# Patient Record
Sex: MALE | Race: WHITE | Employment: OTHER | URBAN - NONMETROPOLITAN AREA
[De-identification: names, ages, dates, MRNs, and addresses within clinical notes are randomized per-mention and may not be internally consistent; named-entity substitution may affect disease eponyms.]

---

## 2017-06-05 ENCOUNTER — OFFICE VISIT (OUTPATIENT)
Dept: FAMILY MEDICINE CLINIC | Age: 72
End: 2017-06-05

## 2017-06-05 VITALS
DIASTOLIC BLOOD PRESSURE: 62 MMHG | HEIGHT: 71 IN | HEART RATE: 68 BPM | WEIGHT: 203 LBS | BODY MASS INDEX: 28.42 KG/M2 | SYSTOLIC BLOOD PRESSURE: 104 MMHG

## 2017-06-05 DIAGNOSIS — Z23 NEED FOR VACCINATION FOR STREP PNEUMONIAE: ICD-10-CM

## 2017-06-05 DIAGNOSIS — E11.9 TYPE 2 DIABETES MELLITUS WITHOUT COMPLICATION, WITHOUT LONG-TERM CURRENT USE OF INSULIN (HCC): Primary | ICD-10-CM

## 2017-06-05 DIAGNOSIS — F31.9 BIPOLAR 1 DISORDER (HCC): ICD-10-CM

## 2017-06-05 DIAGNOSIS — E78.00 PURE HYPERCHOLESTEROLEMIA: ICD-10-CM

## 2017-06-05 DIAGNOSIS — I10 ESSENTIAL HYPERTENSION: ICD-10-CM

## 2017-06-05 DIAGNOSIS — Z12.11 SCREENING FOR COLON CANCER: ICD-10-CM

## 2017-06-05 LAB
CREATININE URINE POCT: 100
HBA1C MFR BLD: 6.9 %
MICROALBUMIN/CREAT 24H UR: 10 MG/G{CREAT}
MICROALBUMIN/CREAT UR-RTO: <30

## 2017-06-05 PROCEDURE — 4004F PT TOBACCO SCREEN RCVD TLK: CPT | Performed by: FAMILY MEDICINE

## 2017-06-05 PROCEDURE — G0009 ADMIN PNEUMOCOCCAL VACCINE: HCPCS | Performed by: FAMILY MEDICINE

## 2017-06-05 PROCEDURE — 4040F PNEUMOC VAC/ADMIN/RCVD: CPT | Performed by: FAMILY MEDICINE

## 2017-06-05 PROCEDURE — 1123F ACP DISCUSS/DSCN MKR DOCD: CPT | Performed by: FAMILY MEDICINE

## 2017-06-05 PROCEDURE — 90670 PCV13 VACCINE IM: CPT | Performed by: FAMILY MEDICINE

## 2017-06-05 PROCEDURE — 3044F HG A1C LEVEL LT 7.0%: CPT | Performed by: FAMILY MEDICINE

## 2017-06-05 PROCEDURE — G8420 CALC BMI NORM PARAMETERS: HCPCS | Performed by: FAMILY MEDICINE

## 2017-06-05 PROCEDURE — 99214 OFFICE O/P EST MOD 30 MIN: CPT | Performed by: FAMILY MEDICINE

## 2017-06-05 PROCEDURE — 83036 HEMOGLOBIN GLYCOSYLATED A1C: CPT | Performed by: FAMILY MEDICINE

## 2017-06-05 PROCEDURE — G8598 ASA/ANTIPLAT THER USED: HCPCS | Performed by: FAMILY MEDICINE

## 2017-06-05 PROCEDURE — G8427 DOCREV CUR MEDS BY ELIG CLIN: HCPCS | Performed by: FAMILY MEDICINE

## 2017-06-05 PROCEDURE — 82044 UR ALBUMIN SEMIQUANTITATIVE: CPT | Performed by: FAMILY MEDICINE

## 2017-06-05 PROCEDURE — 3017F COLORECTAL CA SCREEN DOC REV: CPT | Performed by: FAMILY MEDICINE

## 2017-06-05 RX ORDER — OMEPRAZOLE 20 MG/1
20 CAPSULE, DELAYED RELEASE ORAL DAILY
Qty: 90 CAPSULE | Refills: 1 | Status: SHIPPED | OUTPATIENT
Start: 2017-06-05 | End: 2017-12-28 | Stop reason: SDUPTHER

## 2017-06-05 RX ORDER — DIVALPROEX SODIUM 250 MG/1
500 TABLET, DELAYED RELEASE ORAL 2 TIMES DAILY
Qty: 360 TABLET | Refills: 1 | Status: SHIPPED | OUTPATIENT
Start: 2017-06-05 | End: 2017-12-28 | Stop reason: SDUPTHER

## 2017-06-05 RX ORDER — LOVASTATIN 20 MG/1
20 TABLET ORAL DAILY
Qty: 90 TABLET | Refills: 1 | Status: SHIPPED | OUTPATIENT
Start: 2017-06-05 | End: 2017-11-27 | Stop reason: SDUPTHER

## 2017-06-05 RX ORDER — TRIAMTERENE AND HYDROCHLOROTHIAZIDE 37.5; 25 MG/1; MG/1
1 TABLET ORAL DAILY
Qty: 90 TABLET | Refills: 1 | Status: SHIPPED | OUTPATIENT
Start: 2017-06-05 | End: 2017-12-28 | Stop reason: SDUPTHER

## 2017-06-05 ASSESSMENT — ENCOUNTER SYMPTOMS
EYE REDNESS: 0
BLURRED VISION: 0
SHORTNESS OF BREATH: 0
CHEST TIGHTNESS: 0
EYE DISCHARGE: 0
COLOR CHANGE: 0
VOMITING: 0
NAUSEA: 0

## 2017-06-05 ASSESSMENT — PATIENT HEALTH QUESTIONNAIRE - PHQ9
SUM OF ALL RESPONSES TO PHQ9 QUESTIONS 1 & 2: 0
SUM OF ALL RESPONSES TO PHQ QUESTIONS 1-9: 0
1. LITTLE INTEREST OR PLEASURE IN DOING THINGS: 0
2. FEELING DOWN, DEPRESSED OR HOPELESS: 0

## 2017-06-09 DIAGNOSIS — Z12.11 SCREENING FOR COLON CANCER: ICD-10-CM

## 2017-06-09 LAB
CONTROL: NEGATIVE
HEMOCCULT STL QL: POSITIVE

## 2017-06-09 PROCEDURE — 82274 ASSAY TEST FOR BLOOD FECAL: CPT | Performed by: FAMILY MEDICINE

## 2017-11-27 DIAGNOSIS — E78.00 PURE HYPERCHOLESTEROLEMIA: ICD-10-CM

## 2017-11-27 RX ORDER — LOVASTATIN 20 MG/1
20 TABLET ORAL DAILY
Qty: 90 TABLET | Refills: 1 | Status: SHIPPED | OUTPATIENT
Start: 2017-11-27 | End: 2017-12-28 | Stop reason: SDUPTHER

## 2017-11-27 NOTE — TELEPHONE ENCOUNTER
Olga Reveles called requesting a refill on the following medications:  Requested Prescriptions     Pending Prescriptions Disp Refills    lovastatin (MEVACOR) 20 MG tablet 90 tablet 1     Sig: Take 1 tablet by mouth daily       Date of last visit: Visit date not found  Date of next visit (if applicable):Visit date not found  Date of last refill: 06/2017  Pharmacy Name: [facility] Roxana Sanders

## 2017-12-28 ENCOUNTER — OFFICE VISIT (OUTPATIENT)
Dept: FAMILY MEDICINE CLINIC | Age: 72
End: 2017-12-28
Payer: MEDICARE

## 2017-12-28 VITALS
HEIGHT: 69 IN | WEIGHT: 201 LBS | HEART RATE: 70 BPM | DIASTOLIC BLOOD PRESSURE: 76 MMHG | SYSTOLIC BLOOD PRESSURE: 110 MMHG | BODY MASS INDEX: 29.77 KG/M2

## 2017-12-28 DIAGNOSIS — E78.00 PURE HYPERCHOLESTEROLEMIA: ICD-10-CM

## 2017-12-28 DIAGNOSIS — B96.89 ACUTE BACTERIAL SINUSITIS: ICD-10-CM

## 2017-12-28 DIAGNOSIS — I10 ESSENTIAL HYPERTENSION: ICD-10-CM

## 2017-12-28 DIAGNOSIS — E11.9 TYPE 2 DIABETES MELLITUS WITHOUT COMPLICATION, WITHOUT LONG-TERM CURRENT USE OF INSULIN (HCC): Primary | ICD-10-CM

## 2017-12-28 DIAGNOSIS — J01.90 ACUTE BACTERIAL SINUSITIS: ICD-10-CM

## 2017-12-28 DIAGNOSIS — K21.9 GASTROESOPHAGEAL REFLUX DISEASE WITHOUT ESOPHAGITIS: ICD-10-CM

## 2017-12-28 PROCEDURE — 3044F HG A1C LEVEL LT 7.0%: CPT | Performed by: FAMILY MEDICINE

## 2017-12-28 PROCEDURE — 1123F ACP DISCUSS/DSCN MKR DOCD: CPT | Performed by: FAMILY MEDICINE

## 2017-12-28 PROCEDURE — G8598 ASA/ANTIPLAT THER USED: HCPCS | Performed by: FAMILY MEDICINE

## 2017-12-28 PROCEDURE — G8427 DOCREV CUR MEDS BY ELIG CLIN: HCPCS | Performed by: FAMILY MEDICINE

## 2017-12-28 PROCEDURE — 3017F COLORECTAL CA SCREEN DOC REV: CPT | Performed by: FAMILY MEDICINE

## 2017-12-28 PROCEDURE — G8484 FLU IMMUNIZE NO ADMIN: HCPCS | Performed by: FAMILY MEDICINE

## 2017-12-28 PROCEDURE — 4040F PNEUMOC VAC/ADMIN/RCVD: CPT | Performed by: FAMILY MEDICINE

## 2017-12-28 PROCEDURE — 4004F PT TOBACCO SCREEN RCVD TLK: CPT | Performed by: FAMILY MEDICINE

## 2017-12-28 PROCEDURE — G8419 CALC BMI OUT NRM PARAM NOF/U: HCPCS | Performed by: FAMILY MEDICINE

## 2017-12-28 PROCEDURE — 99214 OFFICE O/P EST MOD 30 MIN: CPT | Performed by: FAMILY MEDICINE

## 2017-12-28 RX ORDER — AMOXICILLIN AND CLAVULANATE POTASSIUM 875; 125 MG/1; MG/1
1 TABLET, FILM COATED ORAL 2 TIMES DAILY
Qty: 20 TABLET | Refills: 0 | Status: SHIPPED | OUTPATIENT
Start: 2017-12-28 | End: 2018-01-07

## 2017-12-28 RX ORDER — OMEPRAZOLE 20 MG/1
20 CAPSULE, DELAYED RELEASE ORAL DAILY
Qty: 90 CAPSULE | Refills: 1 | Status: SHIPPED | OUTPATIENT
Start: 2017-12-28 | End: 2018-06-25 | Stop reason: SDUPTHER

## 2017-12-28 RX ORDER — LOVASTATIN 20 MG/1
20 TABLET ORAL DAILY
Qty: 90 TABLET | Refills: 1 | Status: SHIPPED | OUTPATIENT
Start: 2017-12-28 | End: 2018-06-04 | Stop reason: SDUPTHER

## 2017-12-28 RX ORDER — DIVALPROEX SODIUM 250 MG/1
500 TABLET, DELAYED RELEASE ORAL 2 TIMES DAILY
Qty: 360 TABLET | Refills: 1 | Status: SHIPPED | OUTPATIENT
Start: 2017-12-28 | End: 2018-06-25 | Stop reason: SDUPTHER

## 2017-12-28 RX ORDER — TRIAMTERENE AND HYDROCHLOROTHIAZIDE 37.5; 25 MG/1; MG/1
1 TABLET ORAL DAILY
Qty: 90 TABLET | Refills: 1 | Status: SHIPPED | OUTPATIENT
Start: 2017-12-28 | End: 2018-06-25 | Stop reason: SDUPTHER

## 2017-12-28 ASSESSMENT — ENCOUNTER SYMPTOMS
EYE DISCHARGE: 0
VOMITING: 0
COUGH: 1
BLURRED VISION: 0
SORE THROAT: 0
SINUS PRESSURE: 1
COLOR CHANGE: 0
SHORTNESS OF BREATH: 0
EYE REDNESS: 0
CHEST TIGHTNESS: 0
NAUSEA: 0

## 2017-12-28 NOTE — PROGRESS NOTES
[address] [place] [address] Drive, [facility]  Phone:  [phone]  Fax:  [phone]       Name: Abbi Conti  :     Chief Complaint   Patient presents with    6 Month Follow-Up    Diabetes    Hypertension    Hyperlipidemia       HPI:     Abbi Conti is a 66 yo male who presents today with his wife for follow-up of T2DM, hypertension, and hyperlipidemia. His last HbA1c was 6.9% in . His last lipid profile in 2016 revealed elevated triglycerides. He states that he has been doing well overall except for nasal congestion, cough, and sinus pressure that he's had for the past 2 weeks and is not improving. Diabetes   He presents for his follow-up diabetic visit. He has type 2 diabetes mellitus. There are no hypoglycemic associated symptoms. Pertinent negatives for hypoglycemia include no dizziness, headaches or pallor. Pertinent negatives for diabetes include no blurred vision, no chest pain, no foot ulcerations, no polydipsia, no polyphagia and no polyuria. There are no hypoglycemic complications. Risk factors for coronary artery disease include diabetes mellitus, dyslipidemia, hypertension and male sex. Current diabetic treatment includes oral agent (monotherapy). He is compliant with treatment all of the time. He is following a generally healthy diet. Hypertension   This is a chronic problem. The current episode started more than 1 year ago. The problem is unchanged. The problem is controlled. Pertinent negatives include no blurred vision, chest pain, headaches, palpitations, peripheral edema or shortness of breath. There are no associated agents to hypertension. Risk factors for coronary artery disease include diabetes mellitus, dyslipidemia and male gender. Past treatments include diuretics. The current treatment provides moderate improvement. There are no compliance problems. Hyperlipidemia   This is a chronic problem.  The current episode glycosylated hemoglobin (Hb A1C)  -     Comprehensive Metabolic Panel; Future  -     Hemoglobin A1C; Future    Essential hypertension  -     triamterene-hydrochlorothiazide (MAXZIDE-25) 37.5-25 MG per tablet; Take 1 tablet by mouth daily  -     Comprehensive Metabolic Panel; Future    Pure hypercholesterolemia  -     lovastatin (MEVACOR) 20 MG tablet; Take 1 tablet by mouth daily  -     Comprehensive Metabolic Panel; Future  -     Lipid Panel; Future    Gastroesophageal reflux disease without esophagitis  -     omeprazole (PRILOSEC) 20 MG delayed release capsule; Take 1 capsule by mouth Daily    Acute bacterial sinusitis  -     amoxicillin-clavulanate (AUGMENTIN) 875-125 MG per tablet; Take 1 tablet by mouth 2 times daily for 10 days    Other orders  -     divalproex (DEPAKOTE) 250 MG DR tablet; Take 2 tablets by mouth 2 times daily    Will check routine labs and continue on current medications for now; he will be notified if dose adjustments are needed. Prescribed Augmentin for sinusitis and advised rest, increased hydration, OTC sinus medications, etc.    Wang Hidalgo received counseling on the following healthy behaviors: nutrition, exercise and medication adherence  Reviewed prior labs and health maintenance  Continue current medications, diet and exercise. Discussed use, benefit, and side effects of prescribed medications. Barriers to medication compliance addressed.   Patient given educational materials - see patient instructions    Requested Prescriptions     Signed Prescriptions Disp Refills    lovastatin (MEVACOR) 20 MG tablet 90 tablet 1     Sig: Take 1 tablet by mouth daily    divalproex (DEPAKOTE) 250 MG DR tablet 360 tablet 1     Sig: Take 2 tablets by mouth 2 times daily    triamterene-hydrochlorothiazide (MAXZIDE-25) 37.5-25 MG per tablet 90 tablet 1     Sig: Take 1 tablet by mouth daily    omeprazole (PRILOSEC) 20 MG delayed release capsule 90 capsule 1     Sig: Take 1 capsule by mouth Daily   

## 2017-12-28 NOTE — PATIENT INSTRUCTIONS
Patient Education        Learning About Diabetes Food Guidelines  Your Care Instructions    Meal planning is important to manage diabetes. It helps keep your blood sugar at a target level (which you set with your doctor). You don't have to eat special foods. You can eat what your family eats, including sweets once in a while. But you do have to pay attention to how often you eat and how much you eat of certain foods. You may want to work with a dietitian or a certified diabetes educator (CDE) to help you plan meals and snacks. A dietitian or CDE can also help you lose weight if that is one of your goals. What should you know about eating carbs? Managing the amount of carbohydrate (carbs) you eat is an important part of healthy meals when you have diabetes. Carbohydrate is found in many foods. · Learn which foods have carbs. And learn the amounts of carbs in different foods. ¨ Bread, cereal, pasta, and rice have about 15 grams of carbs in a serving. A serving is 1 slice of bread (1 ounce), ½ cup of cooked cereal, or 1/3 cup of cooked pasta or rice. ¨ Fruits have 15 grams of carbs in a serving. A serving is 1 small fresh fruit, such as an apple or orange; ½ of a banana; ½ cup of cooked or canned fruit; ½ cup of fruit juice; 1 cup of melon or raspberries; or 2 tablespoons of dried fruit. ¨ Milk and no-sugar-added yogurt have 15 grams of carbs in a serving. A serving is 1 cup of milk or 2/3 cup of no-sugar-added yogurt. ¨ Starchy vegetables have 15 grams of carbs in a serving. A serving is ½ cup of mashed potatoes or sweet potato; 1 cup winter squash; ½ of a small baked potato; ½ cup of cooked beans; or ½ cup cooked corn or green peas. · Learn how much carbs to eat each day and at each meal. A dietitian or CDE can teach you how to keep track of the amount of carbs you eat. This is called carbohydrate counting. · If you are not sure how to count carbohydrate grams, use the Plate Method to plan meals.  It is a good, quick way to make sure that you have a balanced meal. It also helps you spread carbs throughout the day. ¨ Divide your plate by types of foods. Put non-starchy vegetables on half the plate, meat or other protein food on one-quarter of the plate, and a grain or starchy vegetable in the final quarter of the plate. To this you can add a small piece of fruit and 1 cup of milk or yogurt, depending on how many carbs you are supposed to eat at a meal.  · Try to eat about the same amount of carbs at each meal. Do not \"save up\" your daily allowance of carbs to eat at one meal.  · Proteins have very little or no carbs per serving. Examples of proteins are beef, chicken, turkey, fish, eggs, tofu, cheese, cottage cheese, and peanut butter. A serving size of meat is 3 ounces, which is about the size of a deck of cards. Examples of meat substitute serving sizes (equal to 1 ounce of meat) are 1/4 cup of cottage cheese, 1 egg, 1 tablespoon of peanut butter, and ½ cup of tofu. How can you eat out and still eat healthy? · Learn to estimate the serving sizes of foods that have carbohydrate. If you measure food at home, it will be easier to estimate the amount in a serving of restaurant food. · If the meal you order has too much carbohydrate (such as potatoes, corn, or baked beans), ask to have a low-carbohydrate food instead. Ask for a salad or green vegetables. · If you use insulin, check your blood sugar before and after eating out to help you plan how much to eat in the future. · If you eat more carbohydrate at a meal than you had planned, take a walk or do other exercise. This will help lower your blood sugar. What else should you know? · Limit saturated fat, such as the fat from meat and dairy products. This is a healthy choice because people who have diabetes are at higher risk of heart disease. So choose lean cuts of meat and nonfat or low-fat dairy products.  Use olive or canola oil instead of butter or shortening acetaminophen (Tylenol), ibuprofen (Advil, Motrin), or naproxen (Aleve). Read and follow all instructions on the label. · If the doctor prescribed antibiotics, take them as directed. Do not stop taking them just because you feel better. You need to take the full course of antibiotics. · Be careful when taking over-the-counter cold or flu medicines and Tylenol at the same time. Many of these medicines have acetaminophen, which is Tylenol. Read the labels to make sure that you are not taking more than the recommended dose. Too much acetaminophen (Tylenol) can be harmful. · Breathe warm, moist air from a steamy shower, a hot bath, or a sink filled with hot water. Avoid cold, dry air. Using a humidifier in your home may help. Follow the directions for cleaning the machine. · Use saline (saltwater) nasal washes to help keep your nasal passages open and wash out mucus and bacteria. You can buy saline nose drops at a grocery store or drugstore. Or you can make your own at home by adding 1 teaspoon of salt and 1 teaspoon of baking soda to 2 cups of distilled water. If you make your own, fill a bulb syringe with the solution, insert the tip into your nostril, and squeeze gently. Jayashree Dense your nose. · Put a hot, wet towel or a warm gel pack on your face 3 or 4 times a day for 5 to 10 minutes each time. · Try a decongestant nasal spray like oxymetazoline (Afrin). Do not use it for more than 3 days in a row. Using it for more than 3 days can make your congestion worse. When should you call for help? Call your doctor now or seek immediate medical care if:  ? · You have new or worse swelling or redness in your face or around your eyes. ? · You have a new or higher fever. ? Watch closely for changes in your health, and be sure to contact your doctor if:  ? · You have new or worse facial pain. ? · The mucus from your nose becomes thicker (like pus) or has new blood in it. ? · You are not getting better as expected.

## 2018-01-29 ENCOUNTER — TELEPHONE (OUTPATIENT)
Dept: FAMILY MEDICINE CLINIC | Age: 73
End: 2018-01-29

## 2018-01-29 ENCOUNTER — HOSPITAL ENCOUNTER (OUTPATIENT)
Age: 73
Discharge: HOME | End: 2018-01-29
Payer: MEDICARE

## 2018-01-29 DIAGNOSIS — I10 ESSENTIAL HYPERTENSION: ICD-10-CM

## 2018-01-29 DIAGNOSIS — E78.00 PURE HYPERCHOLESTEROLEMIA: ICD-10-CM

## 2018-01-29 DIAGNOSIS — E11.9 TYPE 2 DIABETES MELLITUS WITHOUT COMPLICATION, WITHOUT LONG-TERM CURRENT USE OF INSULIN (HCC): ICD-10-CM

## 2018-01-29 LAB
ALBUMIN SERPL-MCNC: 4.4 G/DL (ref 3.5–5.1)
ALP BLD-CCNC: 75 U/L (ref 38–126)
ALT SERPL-CCNC: 41 U/L (ref 11–66)
ANION GAP SERPL CALCULATED.3IONS-SCNC: 17 MEQ/L (ref 8–16)
AST SERPL-CCNC: 40 U/L (ref 5–40)
AVERAGE GLUCOSE: 159 MG/DL (ref 70–126)
BILIRUB SERPL-MCNC: 0.4 MG/DL (ref 0.3–1.2)
BUN BLDV-MCNC: 21 MG/DL (ref 7–22)
CALCIUM SERPL-MCNC: 9.6 MG/DL (ref 8.5–10.5)
CHLORIDE BLD-SCNC: 99 MEQ/L (ref 98–111)
CHOLESTEROL, TOTAL: 169 MG/DL (ref 100–199)
CO2: 26 MEQ/L (ref 23–33)
CREAT SERPL-MCNC: 1 MG/DL (ref 0.4–1.2)
GFR SERPL CREATININE-BSD FRML MDRD: 73 ML/MIN/1.73M2
GLUCOSE BLD-MCNC: 154 MG/DL (ref 70–108)
HBA1C MFR BLD: 7.3 % (ref 4.4–6.4)
HDLC SERPL-MCNC: 43 MG/DL
LDL CHOLESTEROL CALCULATED: 81 MG/DL
POTASSIUM SERPL-SCNC: 4.5 MEQ/L (ref 3.5–5.2)
SODIUM BLD-SCNC: 142 MEQ/L (ref 135–145)
TOTAL PROTEIN: 7.5 G/DL (ref 6.1–8)
TRIGL SERPL-MCNC: 223 MG/DL (ref 0–199)

## 2018-01-29 PROCEDURE — 80061 LIPID PANEL: CPT

## 2018-01-29 PROCEDURE — 36415 COLL VENOUS BLD VENIPUNCTURE: CPT

## 2018-01-29 PROCEDURE — 80053 COMPREHEN METABOLIC PANEL: CPT

## 2018-01-29 PROCEDURE — 83036 HEMOGLOBIN GLYCOSYLATED A1C: CPT

## 2018-01-29 NOTE — TELEPHONE ENCOUNTER
----- Message from Pramod Zepeda MD sent at 1/29/2018  2:32 PM EST -----  Please let the patient know that his triglycerides were a little elevated which can be improved with a healthy low-fat diet and routine physical activity.

## 2018-06-04 DIAGNOSIS — E78.00 PURE HYPERCHOLESTEROLEMIA: ICD-10-CM

## 2018-06-04 RX ORDER — LOVASTATIN 20 MG/1
20 TABLET ORAL DAILY
Qty: 90 TABLET | Refills: 1 | Status: SHIPPED | OUTPATIENT
Start: 2018-06-04 | End: 2018-06-25 | Stop reason: SDUPTHER

## 2018-06-22 ASSESSMENT — ENCOUNTER SYMPTOMS
VOMITING: 0
COLOR CHANGE: 0
BLURRED VISION: 0
EYE REDNESS: 0
EYE DISCHARGE: 0
CHEST TIGHTNESS: 0
NAUSEA: 0

## 2018-06-25 ENCOUNTER — OFFICE VISIT (OUTPATIENT)
Dept: FAMILY MEDICINE CLINIC | Age: 73
End: 2018-06-25
Payer: MEDICARE

## 2018-06-25 VITALS
WEIGHT: 201.6 LBS | HEIGHT: 72 IN | DIASTOLIC BLOOD PRESSURE: 78 MMHG | BODY MASS INDEX: 27.3 KG/M2 | HEART RATE: 66 BPM | SYSTOLIC BLOOD PRESSURE: 124 MMHG

## 2018-06-25 DIAGNOSIS — I10 ESSENTIAL HYPERTENSION: ICD-10-CM

## 2018-06-25 DIAGNOSIS — Z12.11 SCREENING FOR COLON CANCER: ICD-10-CM

## 2018-06-25 DIAGNOSIS — E78.00 PURE HYPERCHOLESTEROLEMIA: ICD-10-CM

## 2018-06-25 DIAGNOSIS — E11.9 TYPE 2 DIABETES MELLITUS WITHOUT COMPLICATION, WITHOUT LONG-TERM CURRENT USE OF INSULIN (HCC): ICD-10-CM

## 2018-06-25 DIAGNOSIS — K21.9 GASTROESOPHAGEAL REFLUX DISEASE WITHOUT ESOPHAGITIS: ICD-10-CM

## 2018-06-25 DIAGNOSIS — F31.9 BIPOLAR 1 DISORDER (HCC): ICD-10-CM

## 2018-06-25 DIAGNOSIS — E11.9 TYPE 2 DIABETES MELLITUS WITHOUT COMPLICATION, WITHOUT LONG-TERM CURRENT USE OF INSULIN (HCC): Primary | ICD-10-CM

## 2018-06-25 LAB
CREATININE URINE POCT: 300
HBA1C MFR BLD: 8.1 %
MICROALBUMIN/CREAT 24H UR: 30 MG/G{CREAT}
MICROALBUMIN/CREAT UR-RTO: 30

## 2018-06-25 PROCEDURE — 3045F PR MOST RECENT HEMOGLOBIN A1C LEVEL 7.0-9.0%: CPT | Performed by: FAMILY MEDICINE

## 2018-06-25 PROCEDURE — 2022F DILAT RTA XM EVC RTNOPTHY: CPT | Performed by: FAMILY MEDICINE

## 2018-06-25 PROCEDURE — 3017F COLORECTAL CA SCREEN DOC REV: CPT | Performed by: FAMILY MEDICINE

## 2018-06-25 PROCEDURE — 83036 HEMOGLOBIN GLYCOSYLATED A1C: CPT | Performed by: FAMILY MEDICINE

## 2018-06-25 PROCEDURE — 3288F FALL RISK ASSESSMENT DOCD: CPT | Performed by: FAMILY MEDICINE

## 2018-06-25 PROCEDURE — G8427 DOCREV CUR MEDS BY ELIG CLIN: HCPCS | Performed by: FAMILY MEDICINE

## 2018-06-25 PROCEDURE — 82044 UR ALBUMIN SEMIQUANTITATIVE: CPT | Performed by: FAMILY MEDICINE

## 2018-06-25 PROCEDURE — 4040F PNEUMOC VAC/ADMIN/RCVD: CPT | Performed by: FAMILY MEDICINE

## 2018-06-25 PROCEDURE — 4004F PT TOBACCO SCREEN RCVD TLK: CPT | Performed by: FAMILY MEDICINE

## 2018-06-25 PROCEDURE — G8598 ASA/ANTIPLAT THER USED: HCPCS | Performed by: FAMILY MEDICINE

## 2018-06-25 PROCEDURE — G8510 SCR DEP NEG, NO PLAN REQD: HCPCS | Performed by: FAMILY MEDICINE

## 2018-06-25 PROCEDURE — 1123F ACP DISCUSS/DSCN MKR DOCD: CPT | Performed by: FAMILY MEDICINE

## 2018-06-25 PROCEDURE — G8419 CALC BMI OUT NRM PARAM NOF/U: HCPCS | Performed by: FAMILY MEDICINE

## 2018-06-25 PROCEDURE — 99214 OFFICE O/P EST MOD 30 MIN: CPT | Performed by: FAMILY MEDICINE

## 2018-06-25 RX ORDER — LOVASTATIN 20 MG/1
20 TABLET ORAL DAILY
Qty: 90 TABLET | Refills: 1 | Status: SHIPPED | OUTPATIENT
Start: 2018-06-25 | End: 2018-12-17 | Stop reason: SDUPTHER

## 2018-06-25 RX ORDER — TRIAMTERENE AND HYDROCHLOROTHIAZIDE 37.5; 25 MG/1; MG/1
1 TABLET ORAL DAILY
Qty: 90 TABLET | Refills: 1 | Status: SHIPPED | OUTPATIENT
Start: 2018-06-25 | End: 2018-12-17 | Stop reason: SDUPTHER

## 2018-06-25 RX ORDER — DIVALPROEX SODIUM 250 MG/1
500 TABLET, DELAYED RELEASE ORAL 2 TIMES DAILY
Qty: 360 TABLET | Refills: 1 | Status: SHIPPED | OUTPATIENT
Start: 2018-06-25 | End: 2018-12-17 | Stop reason: SDUPTHER

## 2018-06-25 RX ORDER — OMEPRAZOLE 20 MG/1
20 CAPSULE, DELAYED RELEASE ORAL DAILY
Qty: 90 CAPSULE | Refills: 1 | Status: SHIPPED | OUTPATIENT
Start: 2018-06-25 | End: 2018-12-17 | Stop reason: SDUPTHER

## 2018-06-25 ASSESSMENT — PATIENT HEALTH QUESTIONNAIRE - PHQ9
1. LITTLE INTEREST OR PLEASURE IN DOING THINGS: 0
SUM OF ALL RESPONSES TO PHQ QUESTIONS 1-9: 0
SUM OF ALL RESPONSES TO PHQ9 QUESTIONS 1 & 2: 0
2. FEELING DOWN, DEPRESSED OR HOPELESS: 0

## 2018-06-27 DIAGNOSIS — Z12.11 SCREENING FOR COLON CANCER: ICD-10-CM

## 2018-06-27 LAB
CONTROL: PRESENT
HEMOCCULT STL QL: NORMAL

## 2018-06-27 PROCEDURE — 82274 ASSAY TEST FOR BLOOD FECAL: CPT | Performed by: FAMILY MEDICINE

## 2018-10-05 ENCOUNTER — CARE COORDINATION (OUTPATIENT)
Dept: CARE COORDINATION | Age: 73
End: 2018-10-05

## 2018-12-04 DIAGNOSIS — E11.9 TYPE 2 DIABETES MELLITUS WITHOUT COMPLICATION, WITHOUT LONG-TERM CURRENT USE OF INSULIN (HCC): ICD-10-CM

## 2018-12-17 ENCOUNTER — OFFICE VISIT (OUTPATIENT)
Dept: FAMILY MEDICINE CLINIC | Age: 73
End: 2018-12-17
Payer: MEDICARE

## 2018-12-17 VITALS
DIASTOLIC BLOOD PRESSURE: 74 MMHG | WEIGHT: 195 LBS | SYSTOLIC BLOOD PRESSURE: 122 MMHG | BODY MASS INDEX: 27.3 KG/M2 | HEART RATE: 66 BPM | HEIGHT: 71 IN

## 2018-12-17 DIAGNOSIS — Z23 NEED FOR VACCINATION FOR STREP PNEUMONIAE: ICD-10-CM

## 2018-12-17 DIAGNOSIS — E11.9 TYPE 2 DIABETES MELLITUS WITHOUT COMPLICATION, WITHOUT LONG-TERM CURRENT USE OF INSULIN (HCC): Primary | ICD-10-CM

## 2018-12-17 DIAGNOSIS — E78.00 PURE HYPERCHOLESTEROLEMIA: ICD-10-CM

## 2018-12-17 DIAGNOSIS — K21.9 GASTROESOPHAGEAL REFLUX DISEASE WITHOUT ESOPHAGITIS: ICD-10-CM

## 2018-12-17 DIAGNOSIS — I10 ESSENTIAL HYPERTENSION: ICD-10-CM

## 2018-12-17 LAB — HBA1C MFR BLD: 6.8 %

## 2018-12-17 PROCEDURE — 90732 PPSV23 VACC 2 YRS+ SUBQ/IM: CPT | Performed by: FAMILY MEDICINE

## 2018-12-17 PROCEDURE — 4040F PNEUMOC VAC/ADMIN/RCVD: CPT | Performed by: FAMILY MEDICINE

## 2018-12-17 PROCEDURE — 4004F PT TOBACCO SCREEN RCVD TLK: CPT | Performed by: FAMILY MEDICINE

## 2018-12-17 PROCEDURE — G8419 CALC BMI OUT NRM PARAM NOF/U: HCPCS | Performed by: FAMILY MEDICINE

## 2018-12-17 PROCEDURE — G0009 ADMIN PNEUMOCOCCAL VACCINE: HCPCS | Performed by: FAMILY MEDICINE

## 2018-12-17 PROCEDURE — 3017F COLORECTAL CA SCREEN DOC REV: CPT | Performed by: FAMILY MEDICINE

## 2018-12-17 PROCEDURE — G8484 FLU IMMUNIZE NO ADMIN: HCPCS | Performed by: FAMILY MEDICINE

## 2018-12-17 PROCEDURE — 2022F DILAT RTA XM EVC RTNOPTHY: CPT | Performed by: FAMILY MEDICINE

## 2018-12-17 PROCEDURE — 1101F PT FALLS ASSESS-DOCD LE1/YR: CPT | Performed by: FAMILY MEDICINE

## 2018-12-17 PROCEDURE — 3044F HG A1C LEVEL LT 7.0%: CPT | Performed by: FAMILY MEDICINE

## 2018-12-17 PROCEDURE — 99214 OFFICE O/P EST MOD 30 MIN: CPT | Performed by: FAMILY MEDICINE

## 2018-12-17 PROCEDURE — 1123F ACP DISCUSS/DSCN MKR DOCD: CPT | Performed by: FAMILY MEDICINE

## 2018-12-17 PROCEDURE — G8427 DOCREV CUR MEDS BY ELIG CLIN: HCPCS | Performed by: FAMILY MEDICINE

## 2018-12-17 PROCEDURE — G8598 ASA/ANTIPLAT THER USED: HCPCS | Performed by: FAMILY MEDICINE

## 2018-12-17 PROCEDURE — 83036 HEMOGLOBIN GLYCOSYLATED A1C: CPT | Performed by: FAMILY MEDICINE

## 2018-12-17 RX ORDER — LOVASTATIN 20 MG/1
20 TABLET ORAL DAILY
Qty: 90 TABLET | Refills: 1 | Status: SHIPPED | OUTPATIENT
Start: 2018-12-17 | End: 2019-06-17 | Stop reason: SDUPTHER

## 2018-12-17 RX ORDER — TRIAMTERENE AND HYDROCHLOROTHIAZIDE 37.5; 25 MG/1; MG/1
1 TABLET ORAL DAILY
Qty: 90 TABLET | Refills: 1 | Status: SHIPPED | OUTPATIENT
Start: 2018-12-17 | End: 2019-06-17 | Stop reason: SDUPTHER

## 2018-12-17 RX ORDER — DIVALPROEX SODIUM 250 MG/1
500 TABLET, DELAYED RELEASE ORAL 2 TIMES DAILY
Qty: 360 TABLET | Refills: 1 | Status: SHIPPED | OUTPATIENT
Start: 2018-12-17 | End: 2019-06-17 | Stop reason: SDUPTHER

## 2018-12-17 RX ORDER — OMEPRAZOLE 20 MG/1
20 CAPSULE, DELAYED RELEASE ORAL DAILY
Qty: 90 CAPSULE | Refills: 1 | Status: SHIPPED | OUTPATIENT
Start: 2018-12-17 | End: 2019-06-17 | Stop reason: SDUPTHER

## 2018-12-17 ASSESSMENT — ENCOUNTER SYMPTOMS
EYE REDNESS: 0
EYE DISCHARGE: 0
VOMITING: 0
BLURRED VISION: 0
COLOR CHANGE: 0
CHEST TIGHTNESS: 0
NAUSEA: 0

## 2018-12-17 NOTE — PROGRESS NOTES
EOM are normal.   Neck: Normal range of motion. Neck supple. Cardiovascular: Normal rate and regular rhythm. Pulmonary/Chest: Effort normal and breath sounds normal. No respiratory distress. He has no wheezes. Abdominal: Soft. Bowel sounds are normal.   Neurological: He is alert and oriented to person, place, and time. Skin: Skin is warm and dry. Psychiatric: He has a normal mood and affect. His behavior is normal.   Vitals reviewed. Assessment/Plan:     Mona Pavon was seen today for 6 month follow-up and diabetes. Diagnoses and all orders for this visit:    Type 2 diabetes mellitus without complication, without long-term current use of insulin ([facility])  -     HbA1c has decreased from 8.1% to 6.9% so will continue with current medication. A healthy low-carb diet and increased physical activity advised. -     POCT glycosylated hemoglobin (Hb A1C)    Essential hypertension        -     Blood pressure is stable so will continue on current medication.    -     Comprehensive Metabolic Panel; Future  -     triamterene-hydrochlorothiazide (MAXZIDE-25) 37.5-25 MG per tablet; Take 1 tablet by mouth daily    Pure hypercholesterolemia  -     Lipid Panel; Future  -     lovastatin (MEVACOR) 20 MG tablet; Take 1 tablet by mouth daily    Gastroesophageal reflux disease without esophagitis  -     omeprazole (PRILOSEC) 20 MG delayed release capsule; Take 1 capsule by mouth Daily    Other orders  -     divalproex (DEPAKOTE) 250 MG DR tablet; Take 2 tablets by mouth 2 times daily        Return in about 6 months (around 6/17/2019) for diabetes, hypertension, hyperlipidemia.     Electronically signed by Adriel Gonzalez MD on 12/17/2018 at 10:54 AM

## 2018-12-17 NOTE — PATIENT INSTRUCTIONS
A1c = 154 mg/dL  · 8% A1c = 183 mg/dL  · 9% A1c = 212 mg/dL  · 10% A1c = 240 mg/dL  · 11% A1c = 269 mg/dL  · 12% A1c = 298 mg/dL  Where can you learn more? Go to [email or web address] and sign in to your [employer] account. Enter U216 in the [employer] games box to learn more about \"Hemoglobin A1c: About This Test.\"     If you do not have an account, please click on the \"Sign Up Now\" link. Current as of: December 7, 2017  Content Version: 11.8  © 8480-8474 [employer]wise, Incorporated. Care instructions adapted under license by [facility] ([facility]). If you have questions about a medical condition or this instruction, always ask your healthcare professional. [address] any warranty or liability for your use of this information.

## 2018-12-18 ENCOUNTER — NURSE ONLY (OUTPATIENT)
Dept: FAMILY MEDICINE CLINIC | Age: 73
End: 2018-12-18
Payer: MEDICARE

## 2018-12-18 ENCOUNTER — TELEPHONE (OUTPATIENT)
Dept: FAMILY MEDICINE CLINIC | Age: 73
End: 2018-12-18

## 2018-12-18 DIAGNOSIS — E78.00 PURE HYPERCHOLESTEROLEMIA: ICD-10-CM

## 2018-12-18 DIAGNOSIS — E11.9 TYPE 2 DIABETES MELLITUS WITHOUT COMPLICATION, WITHOUT LONG-TERM CURRENT USE OF INSULIN (HCC): ICD-10-CM

## 2018-12-18 DIAGNOSIS — I10 ESSENTIAL HYPERTENSION: ICD-10-CM

## 2018-12-18 LAB
ALBUMIN SERPL-MCNC: 4.7 G/DL (ref 3.5–5.1)
ALP BLD-CCNC: 75 U/L (ref 38–126)
ALT SERPL-CCNC: 32 U/L (ref 11–66)
ANION GAP SERPL CALCULATED.3IONS-SCNC: 13 MEQ/L (ref 8–16)
AST SERPL-CCNC: 33 U/L (ref 5–40)
BILIRUB SERPL-MCNC: 0.5 MG/DL (ref 0.3–1.2)
BUN BLDV-MCNC: 18 MG/DL (ref 7–22)
CALCIUM SERPL-MCNC: 9.8 MG/DL (ref 8.5–10.5)
CHLORIDE BLD-SCNC: 97 MEQ/L (ref 98–111)
CHOLESTEROL, TOTAL: 159 MG/DL (ref 100–199)
CO2: 29 MEQ/L (ref 23–33)
CREAT SERPL-MCNC: 1 MG/DL (ref 0.4–1.2)
GFR SERPL CREATININE-BSD FRML MDRD: 73 ML/MIN/1.73M2
GLUCOSE BLD-MCNC: 124 MG/DL (ref 70–108)
HDLC SERPL-MCNC: 37 MG/DL
LDL CHOLESTEROL CALCULATED: 79 MG/DL
POTASSIUM SERPL-SCNC: 4.7 MEQ/L (ref 3.5–5.2)
SODIUM BLD-SCNC: 139 MEQ/L (ref 135–145)
TOTAL PROTEIN: 7.4 G/DL (ref 6.1–8)
TRIGL SERPL-MCNC: 216 MG/DL (ref 0–199)

## 2018-12-18 PROCEDURE — 36415 COLL VENOUS BLD VENIPUNCTURE: CPT | Performed by: FAMILY MEDICINE

## 2018-12-18 NOTE — PROGRESS NOTES
Venipuncture obtained from right  Arm. Patient tolerated the procedure without complications or complaints.

## 2019-04-05 ENCOUNTER — OFFICE VISIT (OUTPATIENT)
Dept: FAMILY MEDICINE CLINIC | Age: 74
End: 2019-04-05
Payer: MEDICARE

## 2019-04-05 VITALS
HEART RATE: 66 BPM | HEIGHT: 71 IN | SYSTOLIC BLOOD PRESSURE: 134 MMHG | BODY MASS INDEX: 27.27 KG/M2 | DIASTOLIC BLOOD PRESSURE: 74 MMHG | WEIGHT: 194.8 LBS

## 2019-04-05 DIAGNOSIS — S81.002A OPEN WOUND OF LEFT KNEE, INITIAL ENCOUNTER: Primary | ICD-10-CM

## 2019-04-05 DIAGNOSIS — F31.9 BIPOLAR 1 DISORDER (HCC): ICD-10-CM

## 2019-04-05 DIAGNOSIS — E11.9 TYPE 2 DIABETES MELLITUS WITHOUT COMPLICATION, WITHOUT LONG-TERM CURRENT USE OF INSULIN (HCC): ICD-10-CM

## 2019-04-05 PROCEDURE — 99213 OFFICE O/P EST LOW 20 MIN: CPT | Performed by: FAMILY MEDICINE

## 2019-04-05 PROCEDURE — 4004F PT TOBACCO SCREEN RCVD TLK: CPT | Performed by: FAMILY MEDICINE

## 2019-04-05 PROCEDURE — 3046F HEMOGLOBIN A1C LEVEL >9.0%: CPT | Performed by: FAMILY MEDICINE

## 2019-04-05 PROCEDURE — 1123F ACP DISCUSS/DSCN MKR DOCD: CPT | Performed by: FAMILY MEDICINE

## 2019-04-05 PROCEDURE — G8419 CALC BMI OUT NRM PARAM NOF/U: HCPCS | Performed by: FAMILY MEDICINE

## 2019-04-05 PROCEDURE — 2022F DILAT RTA XM EVC RTNOPTHY: CPT | Performed by: FAMILY MEDICINE

## 2019-04-05 PROCEDURE — 4040F PNEUMOC VAC/ADMIN/RCVD: CPT | Performed by: FAMILY MEDICINE

## 2019-04-05 PROCEDURE — 3017F COLORECTAL CA SCREEN DOC REV: CPT | Performed by: FAMILY MEDICINE

## 2019-04-05 PROCEDURE — G8427 DOCREV CUR MEDS BY ELIG CLIN: HCPCS | Performed by: FAMILY MEDICINE

## 2019-04-05 PROCEDURE — G8598 ASA/ANTIPLAT THER USED: HCPCS | Performed by: FAMILY MEDICINE

## 2019-04-05 RX ORDER — CETIRIZINE HYDROCHLORIDE 10 MG/1
10 TABLET ORAL DAILY
COMMUNITY

## 2019-04-05 ASSESSMENT — ENCOUNTER SYMPTOMS: COLOR CHANGE: 1

## 2019-04-05 NOTE — PROGRESS NOTES
[address] [place] [address] [address], [address], [place]  Phone:  [phone]  YOZ:[phone]       Name: Yeny Silva  :     Chief Complaint   Patient presents with    Wound Check     left knee       HPI:     HPI  Yeny Silva is a 75 y.o. male who presents today with his wife for evaluation of a wound on his left knee. He fell on the ice 2 months ago and scraped his knee, but it's not healing like he expected. It was draining, but now isn't. He's been cleaning it with peroxide daily and has been applying antibiotic ointment. He is not on blood thinners, and his BG has been controlled so no reason for delayed wound healing. Current Outpatient Medications:     cetirizine (ZYRTEC) 10 MG tablet, Take 10 mg by mouth daily, Disp: , Rfl:     lovastatin (MEVACOR) 20 MG tablet, Take 1 tablet by mouth daily, Disp: 90 tablet, Rfl: 1    divalproex (DEPAKOTE) 250 MG DR tablet, Take 2 tablets by mouth 2 times daily, Disp: 360 tablet, Rfl: 1    triamterene-hydrochlorothiazide (MAXZIDE-25) 37.5-25 MG per tablet, Take 1 tablet by mouth daily, Disp: 90 tablet, Rfl: 1    omeprazole (PRILOSEC) 20 MG delayed release capsule, Take 1 capsule by mouth Daily, Disp: 90 capsule, Rfl: 1    metFORMIN (GLUCOPHAGE) 1000 MG tablet, Take 1 tablet by mouth 2 times daily (with meals), Disp: 180 tablet, Rfl: 1    blood glucose test strips (ASCENSIA AUTODISC VI;ONE TOUCH ULTRA TEST VI) strip, Uses once daily and PRN. Non-insulin use. Dispense with associated test strips. DX: E11.9., Disp: 200 each, Rfl: 3    aspirin 81 MG tablet, Take 81 mg by mouth daily. , Disp: , Rfl:     No Known Allergies    Subjective:      Review of Systems   Constitutional: Negative for chills and fever. Musculoskeletal: Positive for arthralgias and myalgias. Skin: Positive for color change and wound.        Objective:     /74 (Site: Left Upper Arm, Position: Sitting, Cuff Size: Large Adult)   Pulse 66   Ht 5'

## 2019-04-05 NOTE — PATIENT INSTRUCTIONS
Please stop using the peroxide and antibiotic ointment then contact our office in 1 week to let us know if your wound is improving.

## 2019-06-16 ASSESSMENT — ENCOUNTER SYMPTOMS
COLOR CHANGE: 0
EYE REDNESS: 0
CHEST TIGHTNESS: 0
BLURRED VISION: 0
EYE DISCHARGE: 0
VOMITING: 0
NAUSEA: 0
HEARTBURN: 1

## 2019-06-17 ENCOUNTER — OFFICE VISIT (OUTPATIENT)
Dept: FAMILY MEDICINE CLINIC | Age: 74
End: 2019-06-17
Payer: MEDICARE

## 2019-06-17 VITALS
HEART RATE: 70 BPM | DIASTOLIC BLOOD PRESSURE: 64 MMHG | SYSTOLIC BLOOD PRESSURE: 112 MMHG | BODY MASS INDEX: 26.74 KG/M2 | HEIGHT: 71 IN | WEIGHT: 191 LBS

## 2019-06-17 DIAGNOSIS — E78.00 PURE HYPERCHOLESTEROLEMIA: ICD-10-CM

## 2019-06-17 DIAGNOSIS — E11.9 TYPE 2 DIABETES MELLITUS WITHOUT COMPLICATION, WITHOUT LONG-TERM CURRENT USE OF INSULIN (HCC): Primary | ICD-10-CM

## 2019-06-17 DIAGNOSIS — I10 ESSENTIAL HYPERTENSION: ICD-10-CM

## 2019-06-17 DIAGNOSIS — K21.9 GASTROESOPHAGEAL REFLUX DISEASE WITHOUT ESOPHAGITIS: ICD-10-CM

## 2019-06-17 LAB
CREATININE URINE POCT: 200
HBA1C MFR BLD: 6.5 %
MICROALBUMIN/CREAT 24H UR: 30 MG/G{CREAT}
MICROALBUMIN/CREAT UR-RTO: 30

## 2019-06-17 PROCEDURE — 3017F COLORECTAL CA SCREEN DOC REV: CPT | Performed by: FAMILY MEDICINE

## 2019-06-17 PROCEDURE — 82044 UR ALBUMIN SEMIQUANTITATIVE: CPT | Performed by: FAMILY MEDICINE

## 2019-06-17 PROCEDURE — 83036 HEMOGLOBIN GLYCOSYLATED A1C: CPT | Performed by: FAMILY MEDICINE

## 2019-06-17 PROCEDURE — G8510 SCR DEP NEG, NO PLAN REQD: HCPCS | Performed by: FAMILY MEDICINE

## 2019-06-17 PROCEDURE — 1123F ACP DISCUSS/DSCN MKR DOCD: CPT | Performed by: FAMILY MEDICINE

## 2019-06-17 PROCEDURE — G8419 CALC BMI OUT NRM PARAM NOF/U: HCPCS | Performed by: FAMILY MEDICINE

## 2019-06-17 PROCEDURE — G8427 DOCREV CUR MEDS BY ELIG CLIN: HCPCS | Performed by: FAMILY MEDICINE

## 2019-06-17 PROCEDURE — G8598 ASA/ANTIPLAT THER USED: HCPCS | Performed by: FAMILY MEDICINE

## 2019-06-17 PROCEDURE — 4004F PT TOBACCO SCREEN RCVD TLK: CPT | Performed by: FAMILY MEDICINE

## 2019-06-17 PROCEDURE — 99214 OFFICE O/P EST MOD 30 MIN: CPT | Performed by: FAMILY MEDICINE

## 2019-06-17 PROCEDURE — 3046F HEMOGLOBIN A1C LEVEL >9.0%: CPT | Performed by: FAMILY MEDICINE

## 2019-06-17 PROCEDURE — 4040F PNEUMOC VAC/ADMIN/RCVD: CPT | Performed by: FAMILY MEDICINE

## 2019-06-17 PROCEDURE — 2022F DILAT RTA XM EVC RTNOPTHY: CPT | Performed by: FAMILY MEDICINE

## 2019-06-17 RX ORDER — LOVASTATIN 20 MG/1
20 TABLET ORAL DAILY
Qty: 90 TABLET | Refills: 1 | Status: SHIPPED | OUTPATIENT
Start: 2019-06-17 | End: 2019-12-16 | Stop reason: SDUPTHER

## 2019-06-17 RX ORDER — DIVALPROEX SODIUM 250 MG/1
500 TABLET, DELAYED RELEASE ORAL 2 TIMES DAILY
Qty: 360 TABLET | Refills: 1 | Status: SHIPPED | OUTPATIENT
Start: 2019-06-17 | End: 2019-12-16 | Stop reason: SDUPTHER

## 2019-06-17 RX ORDER — OMEPRAZOLE 20 MG/1
20 CAPSULE, DELAYED RELEASE ORAL DAILY
Qty: 90 CAPSULE | Refills: 1 | Status: SHIPPED | OUTPATIENT
Start: 2019-06-17 | End: 2019-12-16 | Stop reason: SDUPTHER

## 2019-06-17 RX ORDER — TRIAMTERENE AND HYDROCHLOROTHIAZIDE 37.5; 25 MG/1; MG/1
1 TABLET ORAL DAILY
Qty: 90 TABLET | Refills: 1 | Status: SHIPPED | OUTPATIENT
Start: 2019-06-17 | End: 2019-12-16 | Stop reason: SDUPTHER

## 2019-06-17 ASSESSMENT — PATIENT HEALTH QUESTIONNAIRE - PHQ9
SUM OF ALL RESPONSES TO PHQ QUESTIONS 1-9: 0
SUM OF ALL RESPONSES TO PHQ QUESTIONS 1-9: 0
1. LITTLE INTEREST OR PLEASURE IN DOING THINGS: 0
2. FEELING DOWN, DEPRESSED OR HOPELESS: 0
SUM OF ALL RESPONSES TO PHQ9 QUESTIONS 1 & 2: 0

## 2019-06-17 NOTE — PROGRESS NOTES
[address] [place] [address] Drive, [facility], [place]  Phone:  [phone]  Fax:  [phone]       Name: Chano Pinon  :     Chief Complaint   Patient presents with    6 Month Follow-Up    Diabetes    Hypertension    Hyperlipidemia       HPI:     Chano Pinon is a 76 yo male who presents today for follow-up of T2DM, hypertension, hyperlipidemia, and GERD. His last HbA1c was good at 6.9% in 2018. At that time his cholesterol cholesterol was controlled at 159, but his triglycerides were elevated at 216. His creatinine was 1.0, and other labs were stable. He states that he's doing well overall and denies any acute issues. He has hearing loss but his wife states they can't afford hearing aides so they don't want to see audiology. Diabetes   He presents for his follow-up diabetic visit. He has type 2 diabetes mellitus. There are no hypoglycemic associated symptoms. Pertinent negatives for hypoglycemia include no dizziness or pallor. Pertinent negatives for diabetes include no blurred vision, no chest pain, no foot ulcerations, no polydipsia, no polyphagia and no polyuria. There are no hypoglycemic complications. Risk factors for coronary artery disease include diabetes mellitus, dyslipidemia, hypertension and male sex. Current diabetic treatment includes oral agent (monotherapy). He is compliant with treatment all of the time. He is following a generally healthy diet. Hypertension   This is a chronic problem. The current episode started more than 1 year ago. The problem is unchanged. The problem is controlled. Pertinent negatives include no blurred vision, chest pain, palpitations or peripheral edema. There are no associated agents to hypertension. Risk factors for coronary artery disease include diabetes mellitus, dyslipidemia and male gender. Past treatments include diuretics. The current treatment provides moderate improvement.  There are no compliance heartburn. Negative for nausea and vomiting. Endocrine: Negative for polydipsia, polyphagia and polyuria. Skin: Negative for color change and pallor. Neurological: Negative for dizziness. Hematological: Negative for adenopathy. Does not bruise/bleed easily. Objective:     /64 (Site: Left Upper Arm, Position: Sitting, Cuff Size: Large Adult)   Pulse 70   Ht 5' 11\" (1.803 m)   Wt 191 lb (86.6 kg)   BMI 26.64 kg/m²     Physical Exam   Constitutional: He is oriented to person, place, and time. He appears well-developed and well-nourished. No distress. HENT:   Head: Atraumatic. Eyes: Conjunctivae and EOM are normal.   Neck: Normal range of motion. Neck supple. Cardiovascular: Normal rate and regular rhythm. Pulmonary/Chest: Effort normal and breath sounds normal. No respiratory distress. He has no wheezes. Abdominal: Soft. Bowel sounds are normal.   Neurological: He is alert and oriented to person, place, and time. Skin: Skin is warm and dry. Psychiatric: He has a normal mood and affect. His behavior is normal.   Vitals reviewed. Assessment/Plan:     Maxine Fong was seen today for 6 month follow-up, diabetes, hypertension and hyperlipidemia. Diagnoses and all orders for this visit:    Type 2 diabetes mellitus without complication, without long-term current use of insulin ([facility])  -     HbA1c is well-controlled at 6.5% so will continue on current medication.  -     POCT glycosylated hemoglobin (Hb A1C)  -     POCT microalbumin  -     metFORMIN (GLUCOPHAGE) 1000 MG tablet; Take 1 tablet by mouth 2 times daily (with meals)    Essential hypertension  -     Blood pressure is controlled so will continue on current medication. -     triamterene-hydrochlorothiazide (MAXZIDE-25) 37.5-25 MG per tablet; Take 1 tablet by mouth daily    Pure hypercholesterolemia  -     A healthy diet and routine physical activity advised. -     lovastatin (MEVACOR) 20 MG tablet;  Take 1 tablet by mouth daily    Gastroesophageal reflux disease without esophagitis  -     omeprazole (PRILOSEC) 20 MG delayed release capsule; Take 1 capsule by mouth Daily    Other orders  -     divalproex (DEPAKOTE) 250 MG DR tablet; Take 2 tablets by mouth 2 times daily      Anastasia Michel received counseling on the following healthy behaviors: nutrition, exercise and medication adherence. Reviewed prior labs and health maintenance. Continue current medications, diet and exercise. Discussed use, benefit, and side effects of prescribed medications. Barriers to medication compliance addressed. Patient given educational materials - see patient instructions. Requested Prescriptions     Signed Prescriptions Disp Refills    lovastatin (MEVACOR) 20 MG tablet 90 tablet 1     Sig: Take 1 tablet by mouth daily    divalproex (DEPAKOTE) 250 MG DR tablet 360 tablet 1     Sig: Take 2 tablets by mouth 2 times daily    triamterene-hydrochlorothiazide (MAXZIDE-25) 37.5-25 MG per tablet 90 tablet 1     Sig: Take 1 tablet by mouth daily    omeprazole (PRILOSEC) 20 MG delayed release capsule 90 capsule 1     Sig: Take 1 capsule by mouth Daily    metFORMIN (GLUCOPHAGE) 1000 MG tablet 180 tablet 1     Sig: Take 1 tablet by mouth 2 times daily (with meals)       All patient questions answered. Patient voiced understanding. Quality Measures    Body mass index is 26.64 kg/m². Elevated. Weight control planned discussed Healthy diet and regular exercise. BP: 112/64. Blood pressure is normal. Treatment plan consists of No treatment change needed. Fall Risk 6/25/2018 6/5/2017 11/23/2015   2 or more falls in past year? no no no   Fall with injury in past year? no no no     The patient does not have a history of falls. I did complete a risk assessment for falls.  A plan of care for falls No Treatment plan indicated    Lab Results   Component Value Date    LDLCALC 79 12/18/2018    (goal LDL reduction with dx if diabetes is 50% LDL reduction)    PHQ

## 2019-07-01 DIAGNOSIS — E11.9 TYPE 2 DIABETES MELLITUS WITHOUT COMPLICATION, WITHOUT LONG-TERM CURRENT USE OF INSULIN (HCC): ICD-10-CM

## 2019-10-30 ENCOUNTER — OFFICE VISIT (OUTPATIENT)
Dept: FAMILY MEDICINE CLINIC | Age: 74
End: 2019-10-30
Payer: MEDICARE

## 2019-10-30 ENCOUNTER — HOSPITAL ENCOUNTER (OUTPATIENT)
Age: 74
Discharge: HOME | End: 2019-10-30
Payer: MEDICARE

## 2019-10-30 VITALS
SYSTOLIC BLOOD PRESSURE: 112 MMHG | WEIGHT: 188 LBS | HEIGHT: 71 IN | BODY MASS INDEX: 26.32 KG/M2 | HEART RATE: 80 BPM | DIASTOLIC BLOOD PRESSURE: 78 MMHG

## 2019-10-30 DIAGNOSIS — Z91.81 AT HIGH RISK FOR FALLS: ICD-10-CM
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
## 2019-10-30 LAB
ANION GAP SERPL CALCULATED.3IONS-SCNC: 16 MEQ/L (ref 8–16)
BASOPHILS # BLD: 0.5 %
BASOPHILS ABSOLUTE: 0 THOU/MM3 (ref 0–0.1)
BUN BLDV-MCNC: 24 MG/DL (ref 7–22)
CALCIUM SERPL-MCNC: 9.7 MG/DL (ref 8.5–10.5)
CHLORIDE BLD-SCNC: 92 MEQ/L (ref 98–111)
CO2: 27 MEQ/L (ref 23–33)
CREAT SERPL-MCNC: 1.1 MG/DL (ref 0.4–1.2)
EOSINOPHIL # BLD: 1.2 %
EOSINOPHILS ABSOLUTE: 0.1 THOU/MM3 (ref 0–0.4)
ERYTHROCYTE [DISTWIDTH] IN BLOOD BY AUTOMATED COUNT: 13.9 % (ref 11.5–14.5)
ERYTHROCYTE [DISTWIDTH] IN BLOOD BY AUTOMATED COUNT: 47.2 FL (ref 35–45)
GFR SERPL CREATININE-BSD FRML MDRD: 65 ML/MIN/1.73M2
GLUCOSE BLD-MCNC: 111 MG/DL (ref 70–108)
HCT VFR BLD CALC: 51.2 % (ref 42–52)
HEMOGLOBIN: 16.6 GM/DL (ref 14–18)
IMMATURE GRANS (ABS): 0.03 THOU/MM3 (ref 0–0.07)
IMMATURE GRANULOCYTES: 0.4 %
LYMPHOCYTES # BLD: 32.3 %
LYMPHOCYTES ABSOLUTE: 2.4 THOU/MM3 (ref 1–4.8)
MCH RBC QN AUTO: 30.1 PG (ref 26–33)
MCHC RBC AUTO-ENTMCNC: 32.4 GM/DL (ref 32.2–35.5)
MCV RBC AUTO: 92.8 FL (ref 80–94)
MONOCYTES # BLD: 11 %
MONOCYTES ABSOLUTE: 0.8 THOU/MM3 (ref 0.4–1.3)
NUCLEATED RED BLOOD CELLS: 0 /100 WBC
PLATELET # BLD: 228 THOU/MM3 (ref 130–400)
PMV BLD AUTO: 10.6 FL (ref 9.4–12.4)
POTASSIUM SERPL-SCNC: 4.2 MEQ/L (ref 3.5–5.2)
RBC # BLD: 5.52 MILL/MM3 (ref 4.7–6.1)
SEG NEUTROPHILS: 54.6 %
SEGMENTED NEUTROPHILS ABSOLUTE COUNT: 4.1 THOU/MM3 (ref 1.8–7.7)
SODIUM BLD-SCNC: 135 MEQ/L (ref 135–145)
VALPROIC ACID LEVEL: 79.8 UG/ML (ref 50–100)
WBC # BLD: 7.5 THOU/MM3 (ref 4.8–10.8)

## 2019-10-30 PROCEDURE — 1123F ACP DISCUSS/DSCN MKR DOCD: CPT | Performed by: FAMILY MEDICINE

## 2019-10-30 PROCEDURE — 85025 COMPLETE CBC W/AUTO DIFF WBC: CPT

## 2019-10-30 PROCEDURE — 4004F PT TOBACCO SCREEN RCVD TLK: CPT | Performed by: FAMILY MEDICINE

## 2019-10-30 PROCEDURE — G8417 CALC BMI ABV UP PARAM F/U: HCPCS | Performed by: FAMILY MEDICINE

## 2019-10-30 PROCEDURE — 99213 OFFICE O/P EST LOW 20 MIN: CPT | Performed by: FAMILY MEDICINE

## 2019-10-30 PROCEDURE — G8598 ASA/ANTIPLAT THER USED: HCPCS | Performed by: FAMILY MEDICINE

## 2019-10-30 PROCEDURE — G8427 DOCREV CUR MEDS BY ELIG CLIN: HCPCS | Performed by: FAMILY MEDICINE

## 2019-10-30 PROCEDURE — 80164 ASSAY DIPROPYLACETIC ACD TOT: CPT

## 2019-10-30 PROCEDURE — G8484 FLU IMMUNIZE NO ADMIN: HCPCS | Performed by: FAMILY MEDICINE

## 2019-10-30 PROCEDURE — 80048 BASIC METABOLIC PNL TOTAL CA: CPT

## 2019-10-30 PROCEDURE — 36415 COLL VENOUS BLD VENIPUNCTURE: CPT

## 2019-10-30 PROCEDURE — 3017F COLORECTAL CA SCREEN DOC REV: CPT | Performed by: FAMILY MEDICINE

## 2019-10-30 PROCEDURE — 4040F PNEUMOC VAC/ADMIN/RCVD: CPT | Performed by: FAMILY MEDICINE

## 2019-10-30 PROCEDURE — 3288F FALL RISK ASSESSMENT DOCD: CPT | Performed by: FAMILY MEDICINE

## 2019-10-30 ASSESSMENT — ENCOUNTER SYMPTOMS
NAUSEA: 0
VOMITING: 0

## 2019-10-31 ENCOUNTER — TELEPHONE (OUTPATIENT)
Dept: FAMILY MEDICINE CLINIC | Age: 74
End: 2019-10-31

## 2019-11-05 ENCOUNTER — HOSPITAL ENCOUNTER (OUTPATIENT)
Dept: PHYSICAL THERAPY | Age: 74
Setting detail: THERAPIES SERIES
Discharge: HOME | End: 2019-11-05
Payer: MEDICARE

## 2019-11-05 PROCEDURE — 97110 THERAPEUTIC EXERCISES: CPT

## 2019-11-05 PROCEDURE — 97161 PT EVAL LOW COMPLEX 20 MIN: CPT

## 2019-11-05 PROCEDURE — 97116 GAIT TRAINING THERAPY: CPT

## 2019-11-07 ENCOUNTER — HOSPITAL ENCOUNTER (OUTPATIENT)
Dept: PHYSICAL THERAPY | Age: 74
Setting detail: THERAPIES SERIES
Discharge: HOME | End: 2019-11-07
Payer: MEDICARE

## 2019-11-07 PROCEDURE — 97110 THERAPEUTIC EXERCISES: CPT

## 2019-11-11 ENCOUNTER — APPOINTMENT (OUTPATIENT)
Dept: PHYSICAL THERAPY | Age: 74
End: 2019-11-11
Payer: MEDICARE

## 2019-11-14 ENCOUNTER — HOSPITAL ENCOUNTER (OUTPATIENT)
Dept: PHYSICAL THERAPY | Age: 74
Setting detail: THERAPIES SERIES
Discharge: HOME | End: 2019-11-14
Payer: MEDICARE

## 2019-11-14 PROCEDURE — 97110 THERAPEUTIC EXERCISES: CPT

## 2019-11-18 ENCOUNTER — HOSPITAL ENCOUNTER (OUTPATIENT)
Dept: PHYSICAL THERAPY | Age: 74
Setting detail: THERAPIES SERIES
Discharge: HOME | End: 2019-11-18
Payer: MEDICARE

## 2019-11-18 PROCEDURE — 97110 THERAPEUTIC EXERCISES: CPT

## 2019-11-18 ASSESSMENT — PAIN SCALES - GENERAL: PAINLEVEL_OUTOF10: 2

## 2019-11-18 ASSESSMENT — PAIN DESCRIPTION - LOCATION: LOCATION: LEG

## 2019-11-18 ASSESSMENT — PAIN DESCRIPTION - ORIENTATION: ORIENTATION: RIGHT;LEFT

## 2019-11-21 ENCOUNTER — HOSPITAL ENCOUNTER (OUTPATIENT)
Dept: PHYSICAL THERAPY | Age: 74
Setting detail: THERAPIES SERIES
Discharge: HOME | End: 2019-11-21
Payer: MEDICARE

## 2019-11-21 PROCEDURE — 97110 THERAPEUTIC EXERCISES: CPT

## 2019-11-25 ENCOUNTER — APPOINTMENT (OUTPATIENT)
Dept: PHYSICAL THERAPY | Age: 74
End: 2019-11-25
Payer: MEDICARE

## 2019-11-27 ENCOUNTER — HOSPITAL ENCOUNTER (OUTPATIENT)
Dept: PHYSICAL THERAPY | Age: 74
Setting detail: THERAPIES SERIES
Discharge: HOME | End: 2019-11-27
Payer: MEDICARE

## 2019-12-15 ASSESSMENT — ENCOUNTER SYMPTOMS
VOMITING: 0
CHEST TIGHTNESS: 0
BLURRED VISION: 0
HEARTBURN: 1
EYE REDNESS: 0
EYE DISCHARGE: 0
COLOR CHANGE: 0
NAUSEA: 0

## 2019-12-16 ENCOUNTER — OFFICE VISIT (OUTPATIENT)
Dept: FAMILY MEDICINE CLINIC | Age: 74
End: 2019-12-16
Payer: MEDICARE

## 2019-12-16 VITALS
HEIGHT: 71 IN | HEART RATE: 91 BPM | DIASTOLIC BLOOD PRESSURE: 72 MMHG | OXYGEN SATURATION: 99 % | SYSTOLIC BLOOD PRESSURE: 110 MMHG | WEIGHT: 193 LBS | BODY MASS INDEX: 27.02 KG/M2

## 2019-12-16 DIAGNOSIS — K21.9 GASTROESOPHAGEAL REFLUX DISEASE WITHOUT ESOPHAGITIS: ICD-10-CM

## 2019-12-16 DIAGNOSIS — E78.00 PURE HYPERCHOLESTEROLEMIA: ICD-10-CM

## 2019-12-16 DIAGNOSIS — I10 ESSENTIAL HYPERTENSION: ICD-10-CM

## 2019-12-16 DIAGNOSIS — E11.9 TYPE 2 DIABETES MELLITUS WITHOUT COMPLICATION, WITHOUT LONG-TERM CURRENT USE OF INSULIN (HCC): ICD-10-CM

## 2019-12-16 DIAGNOSIS — Z12.11 SCREENING FOR COLON CANCER: ICD-10-CM

## 2019-12-16 DIAGNOSIS — Z00.00 ENCOUNTER FOR MEDICARE ANNUAL WELLNESS EXAM: Primary | ICD-10-CM

## 2019-12-16 PROCEDURE — 4040F PNEUMOC VAC/ADMIN/RCVD: CPT | Performed by: FAMILY MEDICINE

## 2019-12-16 PROCEDURE — G8484 FLU IMMUNIZE NO ADMIN: HCPCS | Performed by: FAMILY MEDICINE

## 2019-12-16 PROCEDURE — 1123F ACP DISCUSS/DSCN MKR DOCD: CPT | Performed by: FAMILY MEDICINE

## 2019-12-16 PROCEDURE — 99213 OFFICE O/P EST LOW 20 MIN: CPT | Performed by: FAMILY MEDICINE

## 2019-12-16 PROCEDURE — G0438 PPPS, INITIAL VISIT: HCPCS | Performed by: FAMILY MEDICINE

## 2019-12-16 PROCEDURE — G8598 ASA/ANTIPLAT THER USED: HCPCS | Performed by: FAMILY MEDICINE

## 2019-12-16 PROCEDURE — 3017F COLORECTAL CA SCREEN DOC REV: CPT | Performed by: FAMILY MEDICINE

## 2019-12-16 PROCEDURE — 3044F HG A1C LEVEL LT 7.0%: CPT | Performed by: FAMILY MEDICINE

## 2019-12-16 RX ORDER — LOVASTATIN 20 MG/1
20 TABLET ORAL DAILY
Qty: 90 TABLET | Refills: 1 | Status: SHIPPED | OUTPATIENT
Start: 2019-12-16 | End: 2020-06-15 | Stop reason: SDUPTHER

## 2019-12-16 RX ORDER — TRIAMTERENE AND HYDROCHLOROTHIAZIDE 37.5; 25 MG/1; MG/1
1 TABLET ORAL DAILY
Qty: 90 TABLET | Refills: 1 | Status: SHIPPED | OUTPATIENT
Start: 2019-12-16 | End: 2020-06-15 | Stop reason: SDUPTHER

## 2019-12-16 RX ORDER — OMEPRAZOLE 20 MG/1
20 CAPSULE, DELAYED RELEASE ORAL DAILY
Qty: 90 CAPSULE | Refills: 1 | Status: SHIPPED | OUTPATIENT
Start: 2019-12-16 | End: 2020-06-15 | Stop reason: SDUPTHER

## 2019-12-16 RX ORDER — DIVALPROEX SODIUM 250 MG/1
500 TABLET, DELAYED RELEASE ORAL 2 TIMES DAILY
Qty: 360 TABLET | Refills: 1 | Status: SHIPPED | OUTPATIENT
Start: 2019-12-16 | End: 2020-06-15 | Stop reason: SDUPTHER

## 2019-12-16 ASSESSMENT — LIFESTYLE VARIABLES: HOW OFTEN DO YOU HAVE A DRINK CONTAINING ALCOHOL: 0

## 2019-12-16 ASSESSMENT — PATIENT HEALTH QUESTIONNAIRE - PHQ9
SUM OF ALL RESPONSES TO PHQ QUESTIONS 1-9: 0
SUM OF ALL RESPONSES TO PHQ QUESTIONS 1-9: 0

## 2019-12-20 ENCOUNTER — TELEPHONE (OUTPATIENT)
Dept: FAMILY MEDICINE CLINIC | Age: 74
End: 2019-12-20

## 2019-12-20 DIAGNOSIS — Z12.11 SCREENING FOR COLON CANCER: ICD-10-CM

## 2019-12-20 LAB
CONTROL: PRESENT
HEMOCCULT STL QL: NEGATIVE

## 2019-12-20 PROCEDURE — 82274 ASSAY TEST FOR BLOOD FECAL: CPT | Performed by: FAMILY MEDICINE

## 2019-12-23 ENCOUNTER — HOSPITAL ENCOUNTER (OUTPATIENT)
Age: 74
Discharge: HOME | End: 2019-12-23
Payer: MEDICARE

## 2019-12-23 ENCOUNTER — TELEPHONE (OUTPATIENT)
Dept: FAMILY MEDICINE CLINIC | Age: 74
End: 2019-12-23

## 2019-12-23 DIAGNOSIS — E11.9 TYPE 2 DIABETES MELLITUS WITHOUT COMPLICATION, WITHOUT LONG-TERM CURRENT USE OF INSULIN (HCC): ICD-10-CM

## 2019-12-23 DIAGNOSIS — I10 ESSENTIAL HYPERTENSION: ICD-10-CM

## 2019-12-23 DIAGNOSIS — E78.00 PURE HYPERCHOLESTEROLEMIA: ICD-10-CM

## 2019-12-23 LAB
ALBUMIN SERPL-MCNC: 4.4 G/DL (ref 3.5–5.1)
ALP BLD-CCNC: 65 U/L (ref 38–126)
ALT SERPL-CCNC: 27 U/L (ref 11–66)
ANION GAP SERPL CALCULATED.3IONS-SCNC: 13 MEQ/L (ref 8–16)
AST SERPL-CCNC: 34 U/L (ref 5–40)
AVERAGE GLUCOSE: 135 MG/DL (ref 70–126)
BILIRUB SERPL-MCNC: 0.4 MG/DL (ref 0.3–1.2)
BUN BLDV-MCNC: 15 MG/DL (ref 7–22)
CALCIUM SERPL-MCNC: 9.7 MG/DL (ref 8.5–10.5)
CHLORIDE BLD-SCNC: 100 MEQ/L (ref 98–111)
CHOLESTEROL, TOTAL: 158 MG/DL (ref 100–199)
CO2: 25 MEQ/L (ref 23–33)
CREAT SERPL-MCNC: 0.9 MG/DL (ref 0.4–1.2)
GFR SERPL CREATININE-BSD FRML MDRD: 82 ML/MIN/1.73M2
GLUCOSE BLD-MCNC: 114 MG/DL (ref 70–108)
HBA1C MFR BLD: 6.5 % (ref 4.4–6.4)
HDLC SERPL-MCNC: 44 MG/DL
LDL CHOLESTEROL CALCULATED: 86 MG/DL
POTASSIUM SERPL-SCNC: 4.9 MEQ/L (ref 3.5–5.2)
SODIUM BLD-SCNC: 138 MEQ/L (ref 135–145)
TOTAL PROTEIN: 7.5 G/DL (ref 6.1–8)
TRIGL SERPL-MCNC: 142 MG/DL (ref 0–199)

## 2019-12-23 PROCEDURE — 80053 COMPREHEN METABOLIC PANEL: CPT

## 2019-12-23 PROCEDURE — 36415 COLL VENOUS BLD VENIPUNCTURE: CPT

## 2019-12-23 PROCEDURE — 80061 LIPID PANEL: CPT

## 2019-12-23 PROCEDURE — 83036 HEMOGLOBIN GLYCOSYLATED A1C: CPT

## 2019-12-24 ENCOUNTER — TELEPHONE (OUTPATIENT)
Dept: FAMILY MEDICINE CLINIC | Age: 74
End: 2019-12-24

## 2020-06-14 ASSESSMENT — ENCOUNTER SYMPTOMS
EYE REDNESS: 0
HEARTBURN: 1
VOMITING: 0
NAUSEA: 0
COLOR CHANGE: 0
BLURRED VISION: 0
EYE DISCHARGE: 0
CHEST TIGHTNESS: 0

## 2020-06-14 NOTE — PROGRESS NOTES
antihyperlipidemic treatment includes statins. The current treatment provides moderate improvement of lipids. There are no compliance problems. Gastroesophageal Reflux   He complains of heartburn. He reports no chest pain or no nausea. This is a chronic problem. The current episode started more than 1 year ago. The problem occurs occasionally. The problem has been waxing and waning. The symptoms are aggravated by certain foods. He has tried a PPI for the symptoms. The treatment provided moderate relief. Current Outpatient Medications:     lovastatin (MEVACOR) 20 MG tablet, Take 1 tablet by mouth daily, Disp: 90 tablet, Rfl: 1    divalproex (DEPAKOTE) 250 MG DR tablet, Take 2 tablets by mouth 2 times daily, Disp: 360 tablet, Rfl: 1    triamterene-hydroCHLOROthiazide (MAXZIDE-25) 37.5-25 MG per tablet, Take 1 tablet by mouth daily, Disp: 90 tablet, Rfl: 1    omeprazole (PRILOSEC) 20 MG delayed release capsule, Take 1 capsule by mouth Daily, Disp: 90 capsule, Rfl: 1    metFORMIN (GLUCOPHAGE) 1000 MG tablet, Take 1 tablet by mouth 2 times daily (with meals), Disp: 180 tablet, Rfl: 1    blood glucose test strips (ADVOCATE REDI-CODE) strip, Uses once daily and AS NEEDED. Non-insulin use. DX: E11.9., Disp: 100 strip, Rfl: 0    cetirizine (ZYRTEC) 10 MG tablet, Take 10 mg by mouth daily, Disp: , Rfl:     aspirin 81 MG tablet, Take 81 mg by mouth daily. , Disp: , Rfl:     No Known Allergies    Subjective:      Review of Systems   Constitutional: Negative for fever. HENT: Positive for hearing loss. Eyes: Negative for blurred vision, discharge and redness. Respiratory: Negative for chest tightness. Cardiovascular: Negative for chest pain and palpitations. Gastrointestinal: Positive for heartburn. Negative for nausea and vomiting. Endocrine: Negative for polydipsia, polyphagia and polyuria. Skin: Negative for color change and pallor. Neurological: Negative for dizziness.    Hematological: omeprazole (PRILOSEC) 20 MG delayed release capsule; Take 1 capsule by mouth Daily      Return in about 6 months (around 12/15/2020) for Medicare AWV.     Electronically signed by Amberly aNm MD on 6/15/2020 at 10:04 AM

## 2020-06-15 ENCOUNTER — OFFICE VISIT (OUTPATIENT)
Dept: FAMILY MEDICINE CLINIC | Age: 75
End: 2020-06-15
Payer: MEDICARE

## 2020-06-15 VITALS
WEIGHT: 191 LBS | HEIGHT: 71 IN | BODY MASS INDEX: 26.74 KG/M2 | HEART RATE: 78 BPM | TEMPERATURE: 98.3 F | DIASTOLIC BLOOD PRESSURE: 72 MMHG | SYSTOLIC BLOOD PRESSURE: 108 MMHG | OXYGEN SATURATION: 98 %

## 2020-06-15 PROCEDURE — 4004F PT TOBACCO SCREEN RCVD TLK: CPT | Performed by: FAMILY MEDICINE

## 2020-06-15 PROCEDURE — 4040F PNEUMOC VAC/ADMIN/RCVD: CPT | Performed by: FAMILY MEDICINE

## 2020-06-15 PROCEDURE — G8427 DOCREV CUR MEDS BY ELIG CLIN: HCPCS | Performed by: FAMILY MEDICINE

## 2020-06-15 PROCEDURE — 1123F ACP DISCUSS/DSCN MKR DOCD: CPT | Performed by: FAMILY MEDICINE

## 2020-06-15 PROCEDURE — G8417 CALC BMI ABV UP PARAM F/U: HCPCS | Performed by: FAMILY MEDICINE

## 2020-06-15 PROCEDURE — 99214 OFFICE O/P EST MOD 30 MIN: CPT | Performed by: FAMILY MEDICINE

## 2020-06-15 RX ORDER — LOVASTATIN 20 MG/1
20 TABLET ORAL DAILY
Qty: 90 TABLET | Refills: 1 | Status: SHIPPED | OUTPATIENT
Start: 2020-06-15 | End: 2020-11-23 | Stop reason: SDUPTHER

## 2020-06-15 RX ORDER — TRIAMTERENE AND HYDROCHLOROTHIAZIDE 37.5; 25 MG/1; MG/1
1 TABLET ORAL DAILY
Qty: 90 TABLET | Refills: 1 | Status: SHIPPED | OUTPATIENT
Start: 2020-06-15 | End: 2020-11-23 | Stop reason: SDUPTHER

## 2020-06-15 RX ORDER — DIVALPROEX SODIUM 250 MG/1
500 TABLET, DELAYED RELEASE ORAL 2 TIMES DAILY
Qty: 360 TABLET | Refills: 1 | Status: SHIPPED | OUTPATIENT
Start: 2020-06-15 | End: 2020-11-23 | Stop reason: SDUPTHER

## 2020-06-15 RX ORDER — OMEPRAZOLE 20 MG/1
20 CAPSULE, DELAYED RELEASE ORAL DAILY
Qty: 90 CAPSULE | Refills: 1 | Status: SHIPPED | OUTPATIENT
Start: 2020-06-15 | End: 2020-11-23 | Stop reason: SDUPTHER

## 2020-11-23 RX ORDER — LOVASTATIN 20 MG/1
20 TABLET ORAL DAILY
Qty: 90 TABLET | Refills: 1 | Status: SHIPPED | OUTPATIENT
Start: 2020-11-23 | End: 2021-06-01 | Stop reason: SDUPTHER

## 2020-11-23 RX ORDER — OMEPRAZOLE 20 MG/1
20 CAPSULE, DELAYED RELEASE ORAL DAILY
Qty: 90 CAPSULE | Refills: 1 | Status: SHIPPED | OUTPATIENT
Start: 2020-11-23 | End: 2021-06-01 | Stop reason: SDUPTHER

## 2020-11-23 RX ORDER — TRIAMTERENE AND HYDROCHLOROTHIAZIDE 37.5; 25 MG/1; MG/1
1 TABLET ORAL DAILY
Qty: 90 TABLET | Refills: 1 | Status: SHIPPED | OUTPATIENT
Start: 2020-11-23 | End: 2021-06-01 | Stop reason: SDUPTHER

## 2020-11-23 RX ORDER — DIVALPROEX SODIUM 250 MG/1
500 TABLET, DELAYED RELEASE ORAL 2 TIMES DAILY
Qty: 360 TABLET | Refills: 1 | Status: SHIPPED | OUTPATIENT
Start: 2020-11-23 | End: 2021-06-01 | Stop reason: SDUPTHER

## 2020-11-23 NOTE — TELEPHONE ENCOUNTER
Laura Tolentino called requesting a refill on the following medications:  Requested Prescriptions     Pending Prescriptions Disp Refills    lovastatin (MEVACOR) 20 MG tablet 90 tablet 1     Sig: Take 1 tablet by mouth daily    triamterene-hydroCHLOROthiazide (MAXZIDE-25) 37.5-25 MG per tablet 90 tablet 1     Sig: Take 1 tablet by mouth daily    omeprazole (PRILOSEC) 20 MG delayed release capsule 90 capsule 1     Sig: Take 1 capsule by mouth Daily    divalproex (DEPAKOTE) 250 MG DR tablet 360 tablet 1     Sig: Take 2 tablets by mouth 2 times daily    metFORMIN (GLUCOPHAGE) 1000 MG tablet 180 tablet 1     Sig: Take 1 tablet by mouth 2 times daily (with meals)     Pharmacy verified:  .pv      Date of last visit: 6/15/20  Date of next visit (if applicable): Visit date not found

## 2020-11-30 NOTE — PROGRESS NOTES
[address] [place] [address] Rd, [address]  [place], [place]  Phone:  [phone]  P:[phone]     Medicare Annual Wellness Visit    Name: Carlita Coto Date: 2020   MRN: [id number] Sex: Male   Age: 67 y.o. Ethnicity: Non-/Non    : 1945 Race: Sharon Glass is here for Medicare AWV; 6 Month Follow-Up; Diabetes; and Hypertension    Screenings for behavioral, psychosocial and functional/safety risks, and cognitive dysfunction are all negative except as indicated below. These results, as well as other patient data from the [address] form, are documented in Flowsheets linked to this Encounter. No Known Allergies      Prior to Visit Medications    Medication Sig Taking? Authorizing Provider   lovastatin (MEVACOR) 20 MG tablet Take 1 tablet by mouth daily Yes Rick Moreno MD   triamterene-hydroCHLOROthiazide (MAXZIDE-25) 37.5-25 MG per tablet Take 1 tablet by mouth daily Yes Rick Moreno MD   omeprazole (PRILOSEC) 20 MG delayed release capsule Take 1 capsule by mouth Daily Yes Rick Moreno MD   divalproex (DEPAKOTE) 250 MG DR tablet Take 2 tablets by mouth 2 times daily Yes Rick Moreno MD   metFORMIN (GLUCOPHAGE) 1000 MG tablet Take 1 tablet by mouth 2 times daily (with meals) Yes Rick Moreno MD   cetirizine (ZYRTEC) 10 MG tablet Take 10 mg by mouth daily Yes Historical Provider, MD   aspirin 81 MG tablet Take 81 mg by mouth daily. Yes Historical Provider, MD   blood glucose test strips (ADVOCATE REDI-CODE) strip Uses once daily and AS NEEDED. Non-insulin use. DX: E11.9.   Rick Moreno MD         Past Medical History:   Diagnosis Date    ASCVD (arteriosclerotic cardiovascular disease)     Bipolar 1 disorder ([facility] [address]     GERD (gastroesophageal reflux disease)     Hyperlipidemia     Hypertension     Osteoarthritis     Type II or unspecified type diabetes mellitus without mention of complication, not stated as uncontrolled        Past Surgical History:   Procedure Laterality Date    FOOT SURGERY Left     STOMACH SURGERY         No family history on file. CareTeam (Including outside providers/suppliers regularly involved in providing care):   Patient Care Team:  Tanya Martínez MD as PCP - General (Family Medicine)  Tanya Martínez MD as PCP - [facility]    Wt Readings from Last 3 Encounters:   12/01/20 195 lb (88.5 kg)   06/15/20 191 lb (86.6 kg)   12/16/19 193 lb (87.5 kg)     Vitals:    12/01/20 0930   BP: 112/72   Site: Left Upper Arm   Position: Sitting   Cuff Size: Large Adult   Pulse: 97   Temp: 97.2 °F (36.2 °C)   SpO2: 98%   Weight: 195 lb (88.5 kg)   Height: 5' 11\" (1.803 m)     Body mass index is 27.2 kg/m². Based upon direct observation of the patient, evaluation of cognition reveals recent and remote memory intact. General Appearance: alert and oriented to person, place and time, well developed and well- nourished, in no acute distress  Skin: warm and dry, no rash or erythema  Head: normocephalic and atraumatic  Eyes: extraocular eye movements intact, conjunctivae normal  ENT: tympanic membrane, external ear and ear canal normal bilaterally, nose without deformity, nasal mucosa and turbinates normal without polyps  Neck: supple and non-tender without mass, no thyromegaly or thyroid nodules, no cervical lymphadenopathy  Pulmonary/Chest: clear to auscultation bilaterally- no wheezes, rales or rhonchi, normal air movement, no respiratory distress  Cardiovascular: normal rate, regular rhythm, normal S1 and S2, no murmurs, rubs, clicks, or gallops, distal pulses intact, no carotid bruits  Abdomen: soft, non-tender, non-distended, normal bowel sounds, no masses or organomegaly    Patient's complete Health Risk Assessment and screening values have been reviewed and are found in Flowsheets.  The following problems were reviewed today and where indicated follow up appointments were made and/or referrals ordered. Positive Risk Factor Screenings with Interventions:     Substance History:  Social History     Tobacco History     Smoking Status  Current Every Day Smoker Smoking Frequency  0.5 packs/day for 55 years (27.5 pk yrs)    Smokeless Tobacco Use  Former User Quit date  1/1/1984          Alcohol History     Alcohol Use Status  No          Drug Use     Drug Use Status  No          Sexual Activity     Sexually Active  Not Asked               Alcohol Screening:       A score of 8 or more is associated with harmful or hazardous drinking. A score of 13 or more in women, and 15 or more in men, is likely to indicate alcohol dependence. Substance Abuse Interventions:  · Tobacco abuse:  tobacco cessation tips and resources provided    General Health and ACP:  General  In general, how would you say your health is?: Good  In the past 7 days, have you experienced any of the following?  New or Increased Pain, New or Increased Fatigue, Loneliness, Social Isolation, Stress or Anger?: None of These  Do you get the social and emotional support that you need?: Yes  Do you have a Living Will?: Yes  Advance Directives     Power of  Living Will ACP-Advance Directive ACP-Power of     Not on File Not on File [address] [facility] Risk Interventions:  · No intervention needed    Health Habits/Nutrition:  Health Habits/Nutrition  Do you exercise for at least 20 minutes 2-3 times per week?: (!) No  Have you lost any weight without trying in the past 3 months?: No  Do you eat fewer than 2 meals per day?: No  Have you seen a dentist within the past year?: Yes  Body mass index: (!) 27.19  Health Habits/Nutrition Interventions:  · A healthy diet and routine physical activity encouraged    Hearing/Vision:  No exam data present  Hearing/Vision  Do you or your family notice any trouble with your hearing?: (!) Yes  Do you have difficulty driving, watching TV, or doing any of your daily activities because of your eyesight?: No  Have you had an eye exam within the past year?: Yes  Hearing/Vision Interventions:  · Hearing concerns:  patient declines any further evaluation/treatment for hearing issues    Personalized Preventive Plan   Current Health Maintenance Status  Immunization History   Administered Date(s) Administered    Pneumococcal Conjugate 13-valent ([id number]) 06/05/2017    Pneumococcal Polysaccharide ([id number]) 12/17/2018        Health Maintenance   Topic Date Due    DTaP/Tdap/Td vaccine (1 - Tdap) 04/16/1963    Shingles Vaccine (1 of 2) 04/16/1994    Flu vaccine (1) 09/01/2020    Annual Wellness Visit (AWV)  12/16/2020    Lipid screen  12/23/2020    Potassium monitoring  12/23/2020    Creatinine monitoring  12/23/2020    Pneumococcal 65+ years Vaccine  Completed    Hepatitis A vaccine  Aged Out    Hib vaccine  Aged Out    Meningococcal (ACWY) vaccine  Aged Out     Recommendations for [employer] Due: see orders and patient instructions/AVS.    Recommended screening schedule for the next 5-10 years is provided to the patient in written form: see Patient Instructions/AVS.    Karly Francisco Javier was seen today for medicare awv, 6 month follow-up, diabetes and hypertension. Diagnoses and all orders for this visit:    Encounter for Medicare annual wellness exam        -     Routine health maintenance screening was reviewed as above. Electronically signed by Jourdan Reese MD on 12/1/2020.

## 2020-12-01 ENCOUNTER — OFFICE VISIT (OUTPATIENT)
Dept: FAMILY MEDICINE CLINIC | Age: 75
End: 2020-12-01
Payer: MEDICARE

## 2020-12-01 VITALS
WEIGHT: 195 LBS | HEART RATE: 97 BPM | SYSTOLIC BLOOD PRESSURE: 112 MMHG | TEMPERATURE: 97.2 F | BODY MASS INDEX: 27.3 KG/M2 | HEIGHT: 71 IN | DIASTOLIC BLOOD PRESSURE: 72 MMHG | OXYGEN SATURATION: 98 %

## 2020-12-01 LAB — HBA1C MFR BLD: 6.3 %

## 2020-12-01 PROCEDURE — 4040F PNEUMOC VAC/ADMIN/RCVD: CPT | Performed by: FAMILY MEDICINE

## 2020-12-01 PROCEDURE — G0439 PPPS, SUBSEQ VISIT: HCPCS | Performed by: FAMILY MEDICINE

## 2020-12-01 PROCEDURE — 83036 HEMOGLOBIN GLYCOSYLATED A1C: CPT | Performed by: FAMILY MEDICINE

## 2020-12-01 PROCEDURE — G8484 FLU IMMUNIZE NO ADMIN: HCPCS | Performed by: FAMILY MEDICINE

## 2020-12-01 PROCEDURE — 99213 OFFICE O/P EST LOW 20 MIN: CPT | Performed by: FAMILY MEDICINE

## 2020-12-01 PROCEDURE — 1123F ACP DISCUSS/DSCN MKR DOCD: CPT | Performed by: FAMILY MEDICINE

## 2020-12-01 ASSESSMENT — ENCOUNTER SYMPTOMS
COUGH: 0
SHORTNESS OF BREATH: 0

## 2020-12-01 ASSESSMENT — PATIENT HEALTH QUESTIONNAIRE - PHQ9
SUM OF ALL RESPONSES TO PHQ QUESTIONS 1-9: 0
2. FEELING DOWN, DEPRESSED OR HOPELESS: 0
SUM OF ALL RESPONSES TO PHQ QUESTIONS 1-9: 0
1. LITTLE INTEREST OR PLEASURE IN DOING THINGS: 0
SUM OF ALL RESPONSES TO PHQ QUESTIONS 1-9: 0
SUM OF ALL RESPONSES TO PHQ9 QUESTIONS 1 & 2: 0

## 2020-12-01 ASSESSMENT — LIFESTYLE VARIABLES: HOW OFTEN DO YOU HAVE A DRINK CONTAINING ALCOHOL: 0

## 2020-12-17 ENCOUNTER — TELEPHONE (OUTPATIENT)
Dept: FAMILY MEDICINE CLINIC | Age: 75
End: 2020-12-17

## 2020-12-17 NOTE — TELEPHONE ENCOUNTER
Patient's wife called in. He was seen on 12/01/2020 for medicare wellness. She states his ears were checked and flushed. No documentation of this. She states he was told to use drops for 2 weeks then come in to get flushed again. No documentation of this either. Can he come in for a lavage or do you want him seen.  We are to call wife back at [phone]

## 2021-05-28 ASSESSMENT — ENCOUNTER SYMPTOMS
EYE DISCHARGE: 0
EYE REDNESS: 0
NAUSEA: 0
VOMITING: 0
COLOR CHANGE: 0
HEARTBURN: 1
CHEST TIGHTNESS: 0
BLURRED VISION: 0

## 2021-05-28 NOTE — PROGRESS NOTES
[address] [place] [address] Drive, [facility], [place]  Phone:  [phone]  Fax:  [phone]       Name: Blanca Curiel  :     Chief Complaint   Patient presents with    6 Month Follow-Up    Diabetes Mellitus       HPI:     Blanca Curiel is a 68 yo male who presents today for with his wife follow-up of T2DM, hypertension, hyperlipidemia, and GERD. His last HbA1c was well-controlled at 6.3% in December. He states that he's doing well overall and denies any acute issues. He denies any recent falls. He has trouble hearing but cannot afford hearing aides. He does have an amplifier at home. Diabetes  He presents for his follow-up diabetic visit. He has type 2 diabetes mellitus. There are no hypoglycemic associated symptoms. Pertinent negatives for hypoglycemia include no dizziness or pallor. Pertinent negatives for diabetes include no blurred vision, no chest pain, no foot ulcerations, no polydipsia, no polyphagia and no polyuria. There are no hypoglycemic complications. Risk factors for coronary artery disease include diabetes mellitus, dyslipidemia, hypertension and male sex. Current diabetic treatment includes oral agent (monotherapy). He is compliant with treatment all of the time. He is following a generally healthy diet. Hypertension  This is a chronic problem. The current episode started more than 1 year ago. The problem is unchanged. The problem is controlled. Pertinent negatives include no blurred vision, chest pain, palpitations or peripheral edema. There are no associated agents to hypertension. Risk factors for coronary artery disease include diabetes mellitus, dyslipidemia and male gender. Past treatments include diuretics. The current treatment provides moderate improvement. There are no compliance problems. Hyperlipidemia  This is a chronic problem. The current episode started more than 1 year ago. Pertinent negatives include no chest pain.  Current antihyperlipidemic treatment includes statins. The current treatment provides moderate improvement of lipids. There are no compliance problems. Gastroesophageal Reflux  He complains of heartburn. He reports no chest pain or no nausea. This is a chronic problem. The current episode started more than 1 year ago. The problem occurs occasionally. The problem has been waxing and waning. The symptoms are aggravated by certain foods. He has tried a PPI for the symptoms. The treatment provided moderate relief. Lab Results   Component Value Date    CHOL 158 12/23/2019    TRIG 142 12/23/2019    HDL 44 12/23/2019    LDLCALC 86 12/23/2019     Lab Results   Component Value Date    ALT 27 12/23/2019    AST 34 12/23/2019        Lab Results   Component Value Date     12/23/2019    K 4.9 12/23/2019     12/23/2019    CO2 25 12/23/2019    BUN 15 12/23/2019    CREATININE 0.9 12/23/2019    GLUCOSE 114 (H) 12/23/2019    CALCIUM 9.7 12/23/2019    PROT 7.5 12/23/2019    LABALBU 4.4 12/23/2019    BILITOT 0.4 12/23/2019    ALKPHOS 65 12/23/2019    AST 34 12/23/2019    ALT 27 12/23/2019    LABGLOM 82 (A) 12/23/2019       Current Outpatient Medications:     lovastatin (MEVACOR) 20 MG tablet, Take 1 tablet by mouth daily, Disp: 90 tablet, Rfl: 1    triamterene-hydroCHLOROthiazide (MAXZIDE-25) 37.5-25 MG per tablet, Take 1 tablet by mouth daily, Disp: 90 tablet, Rfl: 1    omeprazole (PRILOSEC) 20 MG delayed release capsule, Take 1 capsule by mouth Daily, Disp: 90 capsule, Rfl: 1    divalproex (DEPAKOTE) 250 MG DR tablet, Take 2 tablets by mouth 2 times daily, Disp: 360 tablet, Rfl: 1    metFORMIN (GLUCOPHAGE) 1000 MG tablet, Take 1 tablet by mouth 2 times daily (with meals), Disp: 180 tablet, Rfl: 1    blood glucose test strips (ADVOCATE REDI-CODE) strip, Uses once daily and AS NEEDED. Non-insulin use.  DX: E11.9., Disp: 100 strip, Rfl: 0    cetirizine (ZYRTEC) 10 MG tablet, Take 10 mg by mouth daily, Disp: , Rfl:    aspirin 81 MG tablet, Take 81 mg by mouth daily. , Disp: , Rfl:     No Known Allergies    Subjective:      Review of Systems   Constitutional: Negative for fever. HENT: Positive for hearing loss. Eyes: Negative for blurred vision, discharge and redness. Respiratory: Negative for chest tightness. Cardiovascular: Negative for chest pain and palpitations. Gastrointestinal: Positive for heartburn. Negative for nausea and vomiting. Endocrine: Negative for polydipsia, polyphagia and polyuria. Skin: Negative for color change and pallor. Neurological: Negative for dizziness. Hematological: Negative for adenopathy. Does not bruise/bleed easily. Objective:     /72 (Site: Left Upper Arm, Position: Sitting, Cuff Size: Large Adult)   Pulse 84   Temp 96.9 °F (36.1 °C) (Temporal)   Resp 14   Ht 5' 11\" (1.803 m)   Wt 192 lb 9.6 oz (87.4 kg)   SpO2 96%   BMI 26.86 kg/m²     Physical Exam  Vitals reviewed. Constitutional:       General: He is not in acute distress. Appearance: He is well-developed. HENT:      Head: Atraumatic. Right Ear: Decreased hearing noted. Left Ear: Decreased hearing noted. Eyes:      Conjunctiva/sclera: Conjunctivae normal.   Cardiovascular:      Rate and Rhythm: Normal rate and regular rhythm. Pulmonary:      Effort: Pulmonary effort is normal. No respiratory distress. Breath sounds: Normal breath sounds. No wheezing. Abdominal:      General: Bowel sounds are normal.      Palpations: Abdomen is soft. Musculoskeletal:      Cervical back: Normal range of motion and neck supple. Skin:     General: Skin is warm and dry. Neurological:      Mental Status: He is alert and oriented to person, place, and time. Psychiatric:         Behavior: Behavior normal.       Assessment/Plan:     Scot Eller was seen today for medicare awv and 6 month follow-up.     Diagnoses and all orders for this visit:    Type 2 diabetes mellitus without complication, without

## 2021-06-01 ENCOUNTER — OFFICE VISIT (OUTPATIENT)
Dept: FAMILY MEDICINE CLINIC | Age: 76
End: 2021-06-01
Payer: MEDICARE

## 2021-06-01 VITALS
SYSTOLIC BLOOD PRESSURE: 124 MMHG | TEMPERATURE: 96.9 F | HEIGHT: 71 IN | HEART RATE: 84 BPM | WEIGHT: 192.6 LBS | OXYGEN SATURATION: 96 % | RESPIRATION RATE: 14 BRPM | DIASTOLIC BLOOD PRESSURE: 72 MMHG | BODY MASS INDEX: 26.96 KG/M2

## 2021-06-01 DIAGNOSIS — E11.9 TYPE 2 DIABETES MELLITUS WITHOUT COMPLICATION, WITHOUT LONG-TERM CURRENT USE OF INSULIN (HCC): Primary | ICD-10-CM

## 2021-06-01 DIAGNOSIS — F31.9 BIPOLAR 1 DISORDER (HCC): ICD-10-CM

## 2021-06-01 DIAGNOSIS — K21.9 GASTROESOPHAGEAL REFLUX DISEASE WITHOUT ESOPHAGITIS: ICD-10-CM

## 2021-06-01 DIAGNOSIS — E78.00 PURE HYPERCHOLESTEROLEMIA: ICD-10-CM

## 2021-06-01 DIAGNOSIS — I10 ESSENTIAL HYPERTENSION: ICD-10-CM

## 2021-06-01 PROCEDURE — G8427 DOCREV CUR MEDS BY ELIG CLIN: HCPCS | Performed by: FAMILY MEDICINE

## 2021-06-01 PROCEDURE — 99214 OFFICE O/P EST MOD 30 MIN: CPT | Performed by: FAMILY MEDICINE

## 2021-06-01 PROCEDURE — 4040F PNEUMOC VAC/ADMIN/RCVD: CPT | Performed by: FAMILY MEDICINE

## 2021-06-01 PROCEDURE — 1123F ACP DISCUSS/DSCN MKR DOCD: CPT | Performed by: FAMILY MEDICINE

## 2021-06-01 PROCEDURE — 4004F PT TOBACCO SCREEN RCVD TLK: CPT | Performed by: FAMILY MEDICINE

## 2021-06-01 PROCEDURE — G8417 CALC BMI ABV UP PARAM F/U: HCPCS | Performed by: FAMILY MEDICINE

## 2021-06-01 RX ORDER — OMEPRAZOLE 20 MG/1
20 CAPSULE, DELAYED RELEASE ORAL DAILY
Qty: 90 CAPSULE | Refills: 1 | Status: SHIPPED | OUTPATIENT
Start: 2021-06-01 | End: 2022-01-20

## 2021-06-01 RX ORDER — TRIAMTERENE AND HYDROCHLOROTHIAZIDE 37.5; 25 MG/1; MG/1
1 TABLET ORAL DAILY
Qty: 90 TABLET | Refills: 1 | Status: ON HOLD
Start: 2021-06-01 | End: 2021-11-13 | Stop reason: HOSPADM

## 2021-06-01 RX ORDER — DIVALPROEX SODIUM 250 MG/1
500 TABLET, DELAYED RELEASE ORAL 2 TIMES DAILY
Qty: 360 TABLET | Refills: 1 | Status: SHIPPED | OUTPATIENT
Start: 2021-06-01 | End: 2022-01-20

## 2021-06-01 RX ORDER — LOVASTATIN 20 MG/1
20 TABLET ORAL DAILY
Qty: 90 TABLET | Refills: 1 | Status: SHIPPED | OUTPATIENT
Start: 2021-06-01 | End: 2021-12-14

## 2021-06-01 ASSESSMENT — PATIENT HEALTH QUESTIONNAIRE - PHQ9
2. FEELING DOWN, DEPRESSED OR HOPELESS: 0
SUM OF ALL RESPONSES TO PHQ QUESTIONS 1-9: 0
1. LITTLE INTEREST OR PLEASURE IN DOING THINGS: 0
SUM OF ALL RESPONSES TO PHQ QUESTIONS 1-9: 0
SUM OF ALL RESPONSES TO PHQ QUESTIONS 1-9: 0
SUM OF ALL RESPONSES TO PHQ9 QUESTIONS 1 & 2: 0

## 2021-06-07 ENCOUNTER — HOSPITAL ENCOUNTER (OUTPATIENT)
Age: 76
Discharge: HOME | End: 2021-06-07
Payer: MEDICARE

## 2021-06-07 DIAGNOSIS — E78.00 PURE HYPERCHOLESTEROLEMIA: ICD-10-CM

## 2021-06-07 DIAGNOSIS — I10 ESSENTIAL HYPERTENSION: ICD-10-CM

## 2021-06-07 DIAGNOSIS — E11.9 TYPE 2 DIABETES MELLITUS WITHOUT COMPLICATION, WITHOUT LONG-TERM CURRENT USE OF INSULIN (HCC): ICD-10-CM

## 2021-06-07 LAB
ALBUMIN SERPL-MCNC: 4.5 G/DL (ref 3.5–5.1)
ALP BLD-CCNC: 58 U/L (ref 38–126)
ALT SERPL-CCNC: 25 U/L (ref 11–66)
ANION GAP SERPL CALCULATED.3IONS-SCNC: 12 MEQ/L (ref 8–16)
AST SERPL-CCNC: 28 U/L (ref 5–40)
AVERAGE GLUCOSE: 123 MG/DL (ref 70–126)
BILIRUB SERPL-MCNC: 0.3 MG/DL (ref 0.3–1.2)
BUN BLDV-MCNC: 14 MG/DL (ref 7–22)
CALCIUM SERPL-MCNC: 9.8 MG/DL (ref 8.5–10.5)
CHLORIDE BLD-SCNC: 95 MEQ/L (ref 98–111)
CHOLESTEROL, TOTAL: 137 MG/DL (ref 100–199)
CO2: 27 MEQ/L (ref 23–33)
CREAT SERPL-MCNC: 1 MG/DL (ref 0.4–1.2)
GFR SERPL CREATININE-BSD FRML MDRD: 72 ML/MIN/1.73M2
GLUCOSE BLD-MCNC: 101 MG/DL (ref 70–108)
HBA1C MFR BLD: 6.1 % (ref 4.4–6.4)
HDLC SERPL-MCNC: 49 MG/DL
LDL CHOLESTEROL CALCULATED: 65 MG/DL
POTASSIUM SERPL-SCNC: 4.8 MEQ/L (ref 3.5–5.2)
SODIUM BLD-SCNC: 134 MEQ/L (ref 135–145)
TOTAL PROTEIN: 7.3 G/DL (ref 6.1–8)
TRIGL SERPL-MCNC: 115 MG/DL (ref 0–199)

## 2021-06-07 PROCEDURE — 83036 HEMOGLOBIN GLYCOSYLATED A1C: CPT

## 2021-06-07 PROCEDURE — 36415 COLL VENOUS BLD VENIPUNCTURE: CPT

## 2021-06-07 PROCEDURE — 80061 LIPID PANEL: CPT

## 2021-06-07 PROCEDURE — 80053 COMPREHEN METABOLIC PANEL: CPT

## 2021-06-08 ENCOUNTER — TELEPHONE (OUTPATIENT)
Dept: FAMILY MEDICINE CLINIC | Age: 76
End: 2021-06-08

## 2021-06-08 NOTE — TELEPHONE ENCOUNTER
----- Message from Loni Márquez MD sent at 6/7/2021  9:11 PM EDT -----  Kidney function has decreased some so we'll continue to monitor. Other labs look good.

## 2021-09-30 ENCOUNTER — TELEPHONE (OUTPATIENT)
Dept: FAMILY MEDICINE CLINIC | Age: 76
End: 2021-09-30

## 2021-09-30 NOTE — TELEPHONE ENCOUNTER
----- Message from Brittany Ariel sent at 9/29/2021  1:11 PM EDT -----  Subject: Appointment Request    Reason for Call: Routine (Patient Request) No Script    QUESTIONS  Type of Appointment? Established Patient  Reason for appointment request? No appointments available during search  Additional Information for Provider? Patient received a letter states Angle Acosta will no longer be in practice and needs another doc. Nothing in   her area. Please contact her.   ---------------------------------------------------------------------------  --------------  CALL BACK INFO  What is the best way for the office to contact you? OK to leave message on   voicemail  Preferred Call Back Phone Number? [phone]  ---------------------------------------------------------------------------  --------------  SCRIPT ANSWERS  Relationship to Patient? Other  Representative Name? Pranay Bradley  Additional information verified (besides Name and Date of Birth)? Address  Specialty Confirmation? Primary Care  (Is the patient requesting to see the provider for a procedure?)? No  (Is the patient requesting to see the provider urgently  today or   tomorrow. )? No  Have you been diagnosed with, awaiting test results for, or told that you   are suspected of having COVID-19 (Coronavirus)? (If patient has tested   negative or was tested as a requirement for work, school, or travel and   not based on symptoms, answer no)? No  Within the past two weeks have you developed any of the following symptoms   (answer no if symptoms have been present longer than 2 weeks or began   more than 2 weeks ago)? Fever or Chills, Cough, Shortness of breath or   difficulty breathing, Loss of taste or smell, Sore throat, Nasal   congestion, Sneezing or runny nose, Fatigue or generalized body aches   (answer no if pain is specific to a body part e.g. back pain), Diarrhea,   Headache? No  Have you had close contact with someone with COVID-19 in the last 14 days? No  (Service Expert  click yes below to proceed with [employer] As Usual   Scheduling)?  Yes

## 2021-11-07 ENCOUNTER — HOSPITAL ENCOUNTER (INPATIENT)
Age: 76
LOS: 2 days | Discharge: HOME | DRG: 871 | End: 2021-11-09
Attending: HOSPITALIST | Admitting: HOSPITALIST
Payer: MEDICARE

## 2021-11-07 DIAGNOSIS — J18.9 PNEUMONIA DUE TO INFECTIOUS ORGANISM, UNSPECIFIED LATERALITY, UNSPECIFIED PART OF LUNG: Primary | ICD-10-CM

## 2021-11-07 LAB
ALBUMIN SERPL-MCNC: 3.9 G/DL (ref 3.5–5.1)
ALP BLD-CCNC: 77 U/L (ref 38–126)
ALT SERPL-CCNC: 285 U/L (ref 11–66)
ANION GAP SERPL CALCULATED.3IONS-SCNC: 20 MEQ/L (ref 8–16)
AST SERPL-CCNC: 280 U/L (ref 5–40)
AVERAGE GLUCOSE: 159 MG/DL (ref 70–126)
BILIRUB SERPL-MCNC: 0.8 MG/DL (ref 0.3–1.2)
BUN BLDV-MCNC: 15 MG/DL (ref 7–22)
CALCIUM SERPL-MCNC: 8.2 MG/DL (ref 8.5–10.5)
CHLORIDE BLD-SCNC: 98 MEQ/L (ref 98–111)
CO2: 18 MEQ/L (ref 23–33)
CREAT SERPL-MCNC: 1.2 MG/DL (ref 0.4–1.2)
GFR SERPL CREATININE-BSD FRML MDRD: 59 ML/MIN/1.73M2
GLUCOSE BLD-MCNC: 103 MG/DL (ref 70–108)
GLUCOSE BLD-MCNC: 153 MG/DL (ref 70–108)
GLUCOSE BLD-MCNC: 95 MG/DL (ref 70–108)
HBA1C MFR BLD: 7.3 % (ref 4.4–6.4)
LACTIC ACID, SEPSIS: 5.4 MMOL/L (ref 0.5–1.9)
LACTIC ACID, SEPSIS: 6.9 MMOL/L (ref 0.5–1.9)
MRSA SCREEN RT-PCR: NEGATIVE
POTASSIUM REFLEX MAGNESIUM: 4.2 MEQ/L (ref 3.5–5.2)
SCAN OF BLOOD SMEAR: NORMAL
SODIUM BLD-SCNC: 136 MEQ/L (ref 135–145)
TOTAL PROTEIN: 6.2 G/DL (ref 6.1–8)
TSH SERPL DL<=0.05 MIU/L-ACNC: 1.53 UIU/ML (ref 0.4–4.2)
VANCOMYCIN RESISTANT ENTEROCOCCUS: NEGATIVE

## 2021-11-07 PROCEDURE — 80053 COMPREHEN METABOLIC PANEL: CPT

## 2021-11-07 PROCEDURE — 87641 MR-STAPH DNA AMP PROBE: CPT

## 2021-11-07 PROCEDURE — 36415 COLL VENOUS BLD VENIPUNCTURE: CPT

## 2021-11-07 PROCEDURE — 85025 COMPLETE CBC W/AUTO DIFF WBC: CPT

## 2021-11-07 PROCEDURE — 2060000000 HC ICU INTERMEDIATE R&B

## 2021-11-07 PROCEDURE — 87500 VANOMYCIN DNA AMP PROBE: CPT

## 2021-11-07 PROCEDURE — 83036 HEMOGLOBIN GLYCOSYLATED A1C: CPT

## 2021-11-07 PROCEDURE — 82948 REAGENT STRIP/BLOOD GLUCOSE: CPT

## 2021-11-07 PROCEDURE — 2580000003 HC RX 258: Performed by: STUDENT IN AN ORGANIZED HEALTH CARE EDUCATION/TRAINING PROGRAM

## 2021-11-07 PROCEDURE — 87081 CULTURE SCREEN ONLY: CPT

## 2021-11-07 PROCEDURE — 6360000002 HC RX W HCPCS: Performed by: STUDENT IN AN ORGANIZED HEALTH CARE EDUCATION/TRAINING PROGRAM

## 2021-11-07 PROCEDURE — 83605 ASSAY OF LACTIC ACID: CPT

## 2021-11-07 PROCEDURE — 6370000000 HC RX 637 (ALT 250 FOR IP): Performed by: STUDENT IN AN ORGANIZED HEALTH CARE EDUCATION/TRAINING PROGRAM

## 2021-11-07 PROCEDURE — 84443 ASSAY THYROID STIM HORMONE: CPT

## 2021-11-07 RX ORDER — DEXTROSE MONOHYDRATE 25 G/50ML
12.5 INJECTION, SOLUTION INTRAVENOUS PRN
Status: DISCONTINUED | OUTPATIENT
Start: 2021-11-07 | End: 2021-11-09 | Stop reason: HOSPADM

## 2021-11-07 RX ORDER — ASPIRIN 81 MG/1
81 TABLET ORAL DAILY
Status: DISCONTINUED | OUTPATIENT
Start: 2021-11-07 | End: 2021-11-09 | Stop reason: HOSPADM

## 2021-11-07 RX ORDER — SODIUM CHLORIDE 0.9 % (FLUSH) 0.9 %
5-40 SYRINGE (ML) INJECTION EVERY 12 HOURS SCHEDULED
Status: DISCONTINUED | OUTPATIENT
Start: 2021-11-07 | End: 2021-11-09 | Stop reason: HOSPADM

## 2021-11-07 RX ORDER — POLYETHYLENE GLYCOL 3350 17 G/17G
17 POWDER, FOR SOLUTION ORAL DAILY PRN
Status: DISCONTINUED | OUTPATIENT
Start: 2021-11-07 | End: 2021-11-09 | Stop reason: HOSPADM

## 2021-11-07 RX ORDER — SODIUM CHLORIDE 9 MG/ML
25 INJECTION, SOLUTION INTRAVENOUS PRN
Status: DISCONTINUED | OUTPATIENT
Start: 2021-11-07 | End: 2021-11-09 | Stop reason: HOSPADM

## 2021-11-07 RX ORDER — PANTOPRAZOLE SODIUM 40 MG/1
40 TABLET, DELAYED RELEASE ORAL
Status: DISCONTINUED | OUTPATIENT
Start: 2021-11-08 | End: 2021-11-09 | Stop reason: HOSPADM

## 2021-11-07 RX ORDER — ACETAMINOPHEN 650 MG/1
650 SUPPOSITORY RECTAL EVERY 6 HOURS PRN
Status: DISCONTINUED | OUTPATIENT
Start: 2021-11-07 | End: 2021-11-09 | Stop reason: HOSPADM

## 2021-11-07 RX ORDER — ACETAMINOPHEN 325 MG/1
650 TABLET ORAL EVERY 6 HOURS PRN
Status: DISCONTINUED | OUTPATIENT
Start: 2021-11-07 | End: 2021-11-09 | Stop reason: HOSPADM

## 2021-11-07 RX ORDER — TRIAMTERENE AND HYDROCHLOROTHIAZIDE 37.5; 25 MG/1; MG/1
1 TABLET ORAL DAILY
Status: DISCONTINUED | OUTPATIENT
Start: 2021-11-07 | End: 2021-11-09 | Stop reason: HOSPADM

## 2021-11-07 RX ORDER — AZITHROMYCIN 250 MG/1
500 TABLET, FILM COATED ORAL DAILY
Status: COMPLETED | OUTPATIENT
Start: 2021-11-07 | End: 2021-11-07

## 2021-11-07 RX ORDER — ONDANSETRON 2 MG/ML
4 INJECTION INTRAMUSCULAR; INTRAVENOUS EVERY 6 HOURS PRN
Status: DISCONTINUED | OUTPATIENT
Start: 2021-11-07 | End: 2021-11-09 | Stop reason: HOSPADM

## 2021-11-07 RX ORDER — CETIRIZINE HYDROCHLORIDE 10 MG/1
10 TABLET ORAL DAILY
Status: DISCONTINUED | OUTPATIENT
Start: 2021-11-07 | End: 2021-11-09 | Stop reason: HOSPADM

## 2021-11-07 RX ORDER — SODIUM CHLORIDE 0.9 % (FLUSH) 0.9 %
5-40 SYRINGE (ML) INJECTION PRN
Status: DISCONTINUED | OUTPATIENT
Start: 2021-11-07 | End: 2021-11-09 | Stop reason: HOSPADM

## 2021-11-07 RX ORDER — ATORVASTATIN CALCIUM 20 MG/1
20 TABLET, FILM COATED ORAL NIGHTLY
Status: DISCONTINUED | OUTPATIENT
Start: 2021-11-07 | End: 2021-11-09 | Stop reason: HOSPADM

## 2021-11-07 RX ORDER — 0.9 % SODIUM CHLORIDE 0.9 %
30 INTRAVENOUS SOLUTION INTRAVENOUS ONCE
Status: COMPLETED | OUTPATIENT
Start: 2021-11-07 | End: 2021-11-07

## 2021-11-07 RX ORDER — AZITHROMYCIN 250 MG/1
250 TABLET, FILM COATED ORAL DAILY
Status: DISCONTINUED | OUTPATIENT
Start: 2021-11-08 | End: 2021-11-09 | Stop reason: HOSPADM

## 2021-11-07 RX ORDER — ONDANSETRON 4 MG/1
4 TABLET, ORALLY DISINTEGRATING ORAL EVERY 8 HOURS PRN
Status: DISCONTINUED | OUTPATIENT
Start: 2021-11-07 | End: 2021-11-09 | Stop reason: HOSPADM

## 2021-11-07 RX ORDER — DEXTROSE MONOHYDRATE 50 MG/ML
100 INJECTION, SOLUTION INTRAVENOUS PRN
Status: DISCONTINUED | OUTPATIENT
Start: 2021-11-07 | End: 2021-11-09 | Stop reason: HOSPADM

## 2021-11-07 RX ORDER — NICOTINE POLACRILEX 4 MG
15 LOZENGE BUCCAL PRN
Status: DISCONTINUED | OUTPATIENT
Start: 2021-11-07 | End: 2021-11-09 | Stop reason: HOSPADM

## 2021-11-07 RX ORDER — DIVALPROEX SODIUM 500 MG/1
500 TABLET, DELAYED RELEASE ORAL 2 TIMES DAILY
Status: DISCONTINUED | OUTPATIENT
Start: 2021-11-07 | End: 2021-11-09 | Stop reason: HOSPADM

## 2021-11-07 RX ADMIN — CETIRIZINE HYDROCHLORIDE 10 MG: 10 TABLET, FILM COATED ORAL at 15:43

## 2021-11-07 RX ADMIN — CEFTRIAXONE SODIUM 1000 MG: 1 INJECTION, POWDER, FOR SOLUTION INTRAMUSCULAR; INTRAVENOUS at 15:44

## 2021-11-07 RX ADMIN — DIVALPROEX SODIUM 500 MG: 500 TABLET, DELAYED RELEASE ORAL at 15:43

## 2021-11-07 RX ADMIN — TRIAMTERENE AND HYDROCHLOROTHIAZIDE 1 TABLET: 37.5; 25 TABLET ORAL at 15:43

## 2021-11-07 RX ADMIN — AZITHROMYCIN MONOHYDRATE 500 MG: 250 TABLET ORAL at 15:43

## 2021-11-07 RX ADMIN — ASPIRIN 81 MG: 81 TABLET, FILM COATED ORAL at 13:59

## 2021-11-07 RX ADMIN — SODIUM CHLORIDE 2661 ML: 9 INJECTION, SOLUTION INTRAVENOUS at 13:57

## 2021-11-07 RX ADMIN — ATORVASTATIN CALCIUM 20 MG: 20 TABLET, FILM COATED ORAL at 21:14

## 2021-11-07 RX ADMIN — SODIUM CHLORIDE, PRESERVATIVE FREE 10 ML: 5 INJECTION INTRAVENOUS at 21:15

## 2021-11-07 RX ADMIN — ENOXAPARIN SODIUM 40 MG: 40 INJECTION SUBCUTANEOUS at 13:59

## 2021-11-07 ASSESSMENT — PAIN SCALES - GENERAL
PAINLEVEL_OUTOF10: 0
PAINLEVEL_OUTOF10: 0

## 2021-11-07 NOTE — H&P
HISTORY & PHYSICAL       Patient:  Dayanna Medina  YOB: 1945    MRN: [id number]     Acct: [de-identified]    PCP: Augusto Vincent MD    Date of Admission: 11/7/2021    Date of Service: Pt seen/examined on 11/7/2021 and Admitted to Inpatient with expected LOS greater than two midnights due to medical therapy. ASSESSMENT and Plan: Active Hospital Problems    Diagnosis Date Noted    Pneumonia [J18.9] 11/07/2021       Acute hypoxic respiratory failure  -Secondary to bilateral pneumonia  -No hx of chronic resp disease  -Start azithromycin and rocephin  -IS  -Wean O2 as tolerated    Sepsis secondary to bilateral pneumonia  Lactic acidosis  -Elevated lactate at outside hospital with tachycardia and tachypnea  -30 mL/kg fluids given on admission  -Abx as above  -Will monitor vitals and labs as well as response to tx plan    Acute encephalopathy  -Septic vs metabolic, likely worsened by hypoxia  -Plan per #1 and #2 as above    HTN  -Continue home maxzide with hold parameters    NIDDM2, controlled  -A1c earlier this year 6.1%  -Home metformin on hold consider lactic acidosis  -SSI ordered    Bipolar disorder  CAD  HLD  GERD  OA  Chronic bilateral hearing loss  -Continue home meds    Chief Complaint:  AMS    History Of Present Illness:      67 y.o. male with past history of CAD, HTN, HLD, GERD, OA, Bipolar disorder, who is direct admitted to [facility] from [facility] for AMS, sepsis, and pneumonia. Per family, patient was found moaning and confused at home, was taken to ED for evaluation. Family states patient does not have any history of chronic respiratory disease, has not had recent illness or been around known sick contacts, and has not had any recent fevers. They deny any history of pneumonia in the past. They state patient is not vaccinated against COVID or flu. Nursing note as follows Dr Saray Aguirre Bounds. 65yo with bilat lower lobe pneumonia, covid negative.  CXR -,, infiltrates showed up on CT abd and pelvis. Having some confusion, delirium, head CT negative. Vomited x1, AST,ALT slightly elevated, CT abdomen negative. WBC 10.5, lactate 8.3, hgb 12.5,trop and BNP wnl, sodium 127, glucose 153. Urine OK. Vitals 96.3F, 154/80,118,89% on room air, 100% on 2 L NC.\"    Details of specific assessments and plan as above. Past Medical History:          Diagnosis Date    ASCVD (arteriosclerotic cardiovascular disease)     Bipolar 1 disorder (HCC)     GERD (gastroesophageal reflux disease)     Hyperlipidemia     Hypertension     Osteoarthritis     Type II or unspecified type diabetes mellitus without mention of complication, not stated as uncontrolled        Past Surgical History:          Procedure Laterality Date    FOOT SURGERY Left     STOMACH SURGERY         Medications Prior to Admission:      Prior to Admission medications    Medication Sig Start Date End Date Taking? Authorizing Provider   lovastatin (MEVACOR) 20 MG tablet Take 1 tablet by mouth daily 6/1/21  Yes Ramiro Abreu MD   triamterene-hydroCHLOROthiazide [facility]) 37.5-25 MG per tablet Take 1 tablet by mouth daily 6/1/21  Yes Ramiro Abreu MD   omeprazole (PRILOSEC) 20 MG delayed release capsule Take 1 capsule by mouth Daily 6/1/21  Yes Ramiro Abreu MD   divalproex (DEPAKOTE) 250 MG DR tablet Take 2 tablets by mouth 2 times daily 6/1/21  Yes Ramiro Abreu MD   metFORMIN (GLUCOPHAGE) 1000 MG tablet Take 1 tablet by mouth 2 times daily (with meals) 6/1/21  Yes Ramiro Abreu MD   cetirizine (ZYRTEC) 10 MG tablet Take 10 mg by mouth daily   Yes Historical Provider, MD   aspirin 81 MG tablet Take 81 mg by mouth daily. Yes Historical Provider, MD   blood glucose test strips (ADVOCATE REDI-CODE) strip Uses once daily and AS NEEDED. Non-insulin use. DX: E11.9. 7/1/19   Ramiro Abreu MD       Allergies:  Patient has no known allergies.     Social History:      The patient currently lives at home with family. TOBACCO:   reports that he has been smoking. He has a 27.50 pack-year smoking history. He quit smokeless tobacco use about 37 years ago. ETOH:   reports no history of alcohol use. Family History:    Positive as follows:    No family history on file. Diet:  ADULT DIET; Regular    REVIEW OF SYSTEMS:   Pertinent positives as noted in the HPI. All other systems reviewed and negative. PHYSICAL EXAM:    /66   Pulse 107   Temp 98.1 °F (36.7 °C) (Oral)   Resp 18   Wt 195 lb 8 oz (88.7 kg)   SpO2 100%   BMI 27.27 kg/m²     General appearance:  No apparent distress, appears stated age and cooperative. HEENT:  Normal cephalic, atraumatic without obvious deformity. Pupils equal, round, and reactive to light. Extra ocular muscles intact. Conjunctivae/corneas clear. Neck: Supple, with full range of motion. No jugular venous distention. Trachea midline. Respiratory:  Normal respiratory effort. Clear to auscultation, bilaterally without Rales/Wheezes/Rhonchi. On 4 lpm O2 via NC. Cardiovascular:  Regular rate and rhythm with normal S1/S2 without murmurs, rubs or gallops. Abdomen: Soft, non-tender, non-distended with normal bowel sounds. Musculoskeletal:  No clubbing, cyanosis or edema bilaterally. Full range of motion without deformity. Skin: Skin color, texture, turgor normal.  No rashes or lesions. Neurologic:  Neurovascularly intact without any focal sensory/motor deficits. Severe bilateral hearing deficits present, pt does not appear to be using any hearing aids at this time. Psychiatric:  Awake and cooperative. Is not oriented to place, time, or self. Capillary Refill: Brisk,< 3 seconds   Peripheral Pulses: +2 palpable, equal bilaterally       Labs:     No results for input(s): WBC, HGB, HCT, PLT in the last 72 hours.   Recent Labs     11/07/21  1351      K 4.2   CL 98   CO2 18*   BUN 15   CREATININE 1.2   CALCIUM 8.2*     Recent Labs     11/07/21  1351   *   * BILITOT 0.8   ALKPHOS 77     No results for input(s): INR in the last 72 hours. No results for input(s): [place] in the last 72 hours. Urinalysis:      Lab Results   Component Value Date    NITRU Negative 11/07/2016    BLOODU Positive 11/07/2016    SPECGRAV 1.020 11/07/2016    GLUCOSEU neg 11/07/2016       Intake & Output:  No intake/output data recorded. No intake/output data recorded. Radiology:     Negative CT head at outside hospital.  Bilateral pneumonia was identified on CT A/P at outside hospital.    No orders to display            DVT prophylaxis: [x] Lovenox                                 [] SCDs                                 [] SQ Heparin                                 [] Encourage ambulation           [] Already on Anticoagulation    Code Status: Full Code      PT/OT Eval Status: n/a    Disposition:    [x] Home       [] TCU       [] Rehab       [] Psych       [] SNF       [] [place]       [] Other-        Thank you Ruben Altamirano MD for the opportunity to be involved in this patient's care.     Electronically signed by Henok Smith MD on 11/7/2021 at 2:24 PM

## 2021-11-07 NOTE — PROGRESS NOTES
Pt admitted to  4K1 via Direct Admission from [facility] via transports with [facility] ambulance. Complaints: Sepsis and Altered Mental Status. IV INT. IV site free of s/s of infection or infiltration. Vital signs obtained. Assessment and data collection initiated. Two nurse skin assessment performed by Frank Roy RN and Elia Giron RN. Oriented to room. Policies and procedures for  explained. All questions answered with no further questions at this time. Fall prevention and safety brochure discussed with patient. Bed alarm on. Call light in reach.

## 2021-11-07 NOTE — PROGRESS NOTES
Audra Galvan, Dr Tk Bryson. 65yo with bilat lower lobe pneumonia, covid negative. CXR -,, infiltrates showed up on CT abd and pelvis. Having some confusion, delirium, head CT negative. Vomited x1, AST,ALT slightly elevated, CT abdomen negative. WBC 10.5, lactate 8.3, hgb 12.5,trop and BNP wnl, sodium 127, glucose 153. Urine OK.  Vitals 96.3F, 154/80,118,89% on room air, 100% on 2 L NC.

## 2021-11-08 LAB
ANION GAP SERPL CALCULATED.3IONS-SCNC: 11 MEQ/L (ref 8–16)
BASOPHILS # BLD: 0.1 %
BASOPHILS # BLD: 0.2 %
BASOPHILS ABSOLUTE: 0 THOU/MM3 (ref 0–0.1)
BASOPHILS ABSOLUTE: 0 THOU/MM3 (ref 0–0.1)
BUN BLDV-MCNC: 17 MG/DL (ref 7–22)
CALCIUM SERPL-MCNC: 7.9 MG/DL (ref 8.5–10.5)
CHLORIDE BLD-SCNC: 101 MEQ/L (ref 98–111)
CO2: 24 MEQ/L (ref 23–33)
CREAT SERPL-MCNC: 1 MG/DL (ref 0.4–1.2)
DIFFERENTIAL TYPE: ABNORMAL
EOSINOPHIL # BLD: 0 %
EOSINOPHIL # BLD: 0.1 %
EOSINOPHILS ABSOLUTE: 0 THOU/MM3 (ref 0–0.4)
EOSINOPHILS ABSOLUTE: 0 THOU/MM3 (ref 0–0.4)
ERYTHROCYTE [DISTWIDTH] IN BLOOD BY AUTOMATED COUNT: 15.4 % (ref 11.5–14.5)
ERYTHROCYTE [DISTWIDTH] IN BLOOD BY AUTOMATED COUNT: 15.7 % (ref 11.5–14.5)
ERYTHROCYTE [DISTWIDTH] IN BLOOD BY AUTOMATED COUNT: 46.2 FL (ref 35–45)
ERYTHROCYTE [DISTWIDTH] IN BLOOD BY AUTOMATED COUNT: 46.7 FL (ref 35–45)
GFR SERPL CREATININE-BSD FRML MDRD: 72 ML/MIN/1.73M2
GLUCOSE BLD-MCNC: 125 MG/DL (ref 70–108)
GLUCOSE BLD-MCNC: 136 MG/DL (ref 70–108)
GLUCOSE BLD-MCNC: 137 MG/DL (ref 70–108)
GLUCOSE BLD-MCNC: 200 MG/DL (ref 70–108)
GLUCOSE BLD-MCNC: 204 MG/DL (ref 70–108)
HCT VFR BLD CALC: 30.2 % (ref 42–52)
HCT VFR BLD CALC: 34.9 % (ref 42–52)
HEMOGLOBIN: 10.7 GM/DL (ref 14–18)
HEMOGLOBIN: 9.4 GM/DL (ref 14–18)
IMMATURE GRANS (ABS): 0.26 THOU/MM3 (ref 0–0.07)
IMMATURE GRANS (ABS): 0.29 THOU/MM3 (ref 0–0.07)
IMMATURE GRANULOCYTES: 1.3 %
IMMATURE GRANULOCYTES: 1.9 %
LACTIC ACID: 3.1 MMOL/L (ref 0.5–2)
LYMPHOCYTES # BLD: 1.5 %
LYMPHOCYTES # BLD: 3.8 %
LYMPHOCYTES ABSOLUTE: 0.3 THOU/MM3 (ref 1–4.8)
LYMPHOCYTES ABSOLUTE: 0.6 THOU/MM3 (ref 1–4.8)
MCH RBC QN AUTO: 25.4 PG (ref 26–33)
MCH RBC QN AUTO: 25.7 PG (ref 26–33)
MCHC RBC AUTO-ENTMCNC: 30.7 GM/DL (ref 32.2–35.5)
MCHC RBC AUTO-ENTMCNC: 31.1 GM/DL (ref 32.2–35.5)
MCV RBC AUTO: 82.5 FL (ref 80–94)
MCV RBC AUTO: 82.9 FL (ref 80–94)
MONOCYTES # BLD: 11 %
MONOCYTES # BLD: 6.5 %
MONOCYTES ABSOLUTE: 1.3 THOU/MM3 (ref 0.4–1.3)
MONOCYTES ABSOLUTE: 1.7 THOU/MM3 (ref 0.4–1.3)
NUCLEATED RED BLOOD CELLS: 0 /100 WBC
NUCLEATED RED BLOOD CELLS: 0 /100 WBC
PATHOLOGIST REVIEW: ABNORMAL
PLATELET # BLD: 171 THOU/MM3 (ref 130–400)
PLATELET # BLD: 181 THOU/MM3 (ref 130–400)
PLATELET ESTIMATE: ADEQUATE
PLATELET ESTIMATE: ADEQUATE
PMV BLD AUTO: 10.2 FL (ref 9.4–12.4)
PMV BLD AUTO: 9.8 FL (ref 9.4–12.4)
POTASSIUM REFLEX MAGNESIUM: 4.3 MEQ/L (ref 3.5–5.2)
RBC # BLD: 3.66 MILL/MM3 (ref 4.7–6.1)
RBC # BLD: 4.21 MILL/MM3 (ref 4.7–6.1)
SCAN OF BLOOD SMEAR: NORMAL
SEG NEUTROPHILS: 83.2 %
SEG NEUTROPHILS: 90.4 %
SEGMENTED NEUTROPHILS ABSOLUTE COUNT: 12.7 THOU/MM3 (ref 1.8–7.7)
SEGMENTED NEUTROPHILS ABSOLUTE COUNT: 17.5 THOU/MM3 (ref 1.8–7.7)
SODIUM BLD-SCNC: 136 MEQ/L (ref 135–145)
WBC # BLD: 15.3 THOU/MM3 (ref 4.8–10.8)
WBC # BLD: 19.4 THOU/MM3 (ref 4.8–10.8)

## 2021-11-08 PROCEDURE — 94640 AIRWAY INHALATION TREATMENT: CPT

## 2021-11-08 PROCEDURE — 82948 REAGENT STRIP/BLOOD GLUCOSE: CPT

## 2021-11-08 PROCEDURE — 83605 ASSAY OF LACTIC ACID: CPT

## 2021-11-08 PROCEDURE — 6370000000 HC RX 637 (ALT 250 FOR IP): Performed by: STUDENT IN AN ORGANIZED HEALTH CARE EDUCATION/TRAINING PROGRAM

## 2021-11-08 PROCEDURE — 6360000002 HC RX W HCPCS: Performed by: STUDENT IN AN ORGANIZED HEALTH CARE EDUCATION/TRAINING PROGRAM

## 2021-11-08 PROCEDURE — 85025 COMPLETE CBC W/AUTO DIFF WBC: CPT

## 2021-11-08 PROCEDURE — 2580000003 HC RX 258: Performed by: STUDENT IN AN ORGANIZED HEALTH CARE EDUCATION/TRAINING PROGRAM

## 2021-11-08 PROCEDURE — 36415 COLL VENOUS BLD VENIPUNCTURE: CPT

## 2021-11-08 PROCEDURE — 80048 BASIC METABOLIC PNL TOTAL CA: CPT

## 2021-11-08 PROCEDURE — 97162 PT EVAL MOD COMPLEX 30 MIN: CPT

## 2021-11-08 PROCEDURE — 97530 THERAPEUTIC ACTIVITIES: CPT

## 2021-11-08 PROCEDURE — 2060000000 HC ICU INTERMEDIATE R&B

## 2021-11-08 PROCEDURE — 97110 THERAPEUTIC EXERCISES: CPT

## 2021-11-08 PROCEDURE — 97165 OT EVAL LOW COMPLEX 30 MIN: CPT

## 2021-11-08 RX ORDER — IPRATROPIUM BROMIDE AND ALBUTEROL SULFATE 2.5; .5 MG/3ML; MG/3ML
1 SOLUTION RESPIRATORY (INHALATION) 3 TIMES DAILY
Status: DISCONTINUED | OUTPATIENT
Start: 2021-11-09 | End: 2021-11-09 | Stop reason: HOSPADM

## 2021-11-08 RX ORDER — IPRATROPIUM BROMIDE AND ALBUTEROL SULFATE 2.5; .5 MG/3ML; MG/3ML
1 SOLUTION RESPIRATORY (INHALATION) EVERY 4 HOURS PRN
Status: DISCONTINUED | OUTPATIENT
Start: 2021-11-08 | End: 2021-11-08

## 2021-11-08 RX ADMIN — PANTOPRAZOLE SODIUM 40 MG: 40 TABLET, DELAYED RELEASE ORAL at 05:40

## 2021-11-08 RX ADMIN — IPRATROPIUM BROMIDE AND ALBUTEROL SULFATE 1 AMPULE: .5; 3 SOLUTION RESPIRATORY (INHALATION) at 21:15

## 2021-11-08 RX ADMIN — TRIAMTERENE AND HYDROCHLOROTHIAZIDE 1 TABLET: 37.5; 25 TABLET ORAL at 08:58

## 2021-11-08 RX ADMIN — CEFTRIAXONE SODIUM 1000 MG: 1 INJECTION, POWDER, FOR SOLUTION INTRAMUSCULAR; INTRAVENOUS at 16:25

## 2021-11-08 RX ADMIN — DIVALPROEX SODIUM 500 MG: 500 TABLET, DELAYED RELEASE ORAL at 08:58

## 2021-11-08 RX ADMIN — SODIUM CHLORIDE, PRESERVATIVE FREE 10 ML: 5 INJECTION INTRAVENOUS at 08:58

## 2021-11-08 RX ADMIN — ENOXAPARIN SODIUM 40 MG: 40 INJECTION SUBCUTANEOUS at 08:58

## 2021-11-08 RX ADMIN — INSULIN LISPRO 2 UNITS: 100 INJECTION, SOLUTION INTRAVENOUS; SUBCUTANEOUS at 13:26

## 2021-11-08 RX ADMIN — CETIRIZINE HYDROCHLORIDE 10 MG: 10 TABLET, FILM COATED ORAL at 08:58

## 2021-11-08 RX ADMIN — AZITHROMYCIN MONOHYDRATE 250 MG: 250 TABLET ORAL at 08:58

## 2021-11-08 RX ADMIN — ATORVASTATIN CALCIUM 20 MG: 20 TABLET, FILM COATED ORAL at 19:53

## 2021-11-08 RX ADMIN — DIVALPROEX SODIUM 500 MG: 500 TABLET, DELAYED RELEASE ORAL at 16:28

## 2021-11-08 RX ADMIN — SODIUM CHLORIDE, PRESERVATIVE FREE 10 ML: 5 INJECTION INTRAVENOUS at 19:54

## 2021-11-08 RX ADMIN — ASPIRIN 81 MG: 81 TABLET, FILM COATED ORAL at 08:58

## 2021-11-08 ASSESSMENT — PAIN SCALES - GENERAL
PAINLEVEL_OUTOF10: 0

## 2021-11-08 NOTE — PROGRESS NOTES
PROGRESS NOTE      Patient:  Cristela De Los Santos      Unit/Bed:4K-01/001-A    YOB: 1945    MRN: [id number]       Acct: [de-identified]     PCP: Ann Cook MD    Date of Admission: 11/7/2021      Assessment/Plan:    Active Hospital Problems    Diagnosis Date Noted    Pneumonia [J18.9] 11/07/2021       Acute hypoxic respiratory failure - improving  -Secondary to bilateral pneumonia  -No hx of chronic resp disease  -Start azithromycin and rocephin  -IS  -Wean O2 as tolerated - was on 4 lpm on admission and is now on 1-2 lpm via NC     Sepsis secondary to bilateral pneumonia - improving  -Elevated lactate at outside hospital with tachycardia and tachypnea  -30 mL/kg fluids given on admission  -Abx as above  -Will monitor vitals and labs as well as response to tx plan  -11/8: Leukocytosis improving. Afebrile overnight.     Acute encephalopathy  -Septic vs metabolic, likely worsened by hypoxia  -Plan per #1 and #2 as above    Lactic acidosis - improved  -Lactate at outside hospital was 8.3, improved to 5.4 on admission, will recheck today. Anion gap metabolic acidosis - resolved  -Likely secondary to sepsis/lactic acidosis  -AG closed  -Continue to hold home metformin     HTN  -Continue home maxzide with hold parameters     NIDDM2, controlled  -A1c earlier this year 6.1%  -Home metformin on hold consider lactic acidosis  -SSI ordered - blood sugars are stable     Bipolar disorder  CAD  HLD  GERD  OA  Chronic bilateral hearing loss  -Continue home meds    Code Status: Full Code    Discharge planning:  Pending clinical improvement, possible discharge to home tomorrow with home health.        Chief Complaint:  AMS     History Of Present Illness:       67 y.o. male with past history of CAD, HTN, HLD, GERD, OA, Bipolar disorder, who is direct admitted to [facility] from [facility] for AMS, sepsis, and pneumonia. Per family, patient was found moaning and confused at home, was taken to ED for evaluation.  Family states patient does not have any history of chronic respiratory disease, has not had recent illness or been around known sick contacts, and has not had any recent fevers. They deny any history of pneumonia in the past. They state patient is not vaccinated against COVID or flu.      Nursing note as follows Edson Reveles, Dr Sol Sanchez. 67yo with bilat lower lobe pneumonia, covid negative. CXR -,, infiltrates showed up on CT abd and pelvis. Having some confusion, delirium, head CT negative. Vomited x1, AST,ALT slightly elevated, CT abdomen negative. WBC 10.5, lactate 8.3, hgb 12.5,trop and BNP wnl, sodium 127, glucose 153. Urine OK. Vitals 96.3F, 154/80,118,89% on room air, 100% on 2 L NC.\"     Details of specific assessments and plan as above. 11/8: Overall significant clinical improvement. Subjective: Pt seen and examined while he is laying in bed. No family at bedside at this time. Pt states he did well overnight, was able to sleep. Denies SOB, states he feels better overall. Denies CP, LH, dizziness, dysphagia.         Medications:  Reviewed    Infusion Medications    sodium chloride      dextrose       Scheduled Medications    aspirin  81 mg Oral Daily    cetirizine  10 mg Oral Daily    divalproex  500 mg Oral BID    atorvastatin  20 mg Oral Nightly    [Held by provider] metFORMIN  1,000 mg Oral BID WC    pantoprazole  40 mg Oral QAM AC    triamterene-hydroCHLOROthiazide  1 tablet Oral Daily    sodium chloride flush  5-40 mL IntraVENous 2 times per day    enoxaparin  40 mg SubCUTAneous Daily    azithromycin  250 mg Oral Daily    cefTRIAXone (ROCEPHIN) IV  1,000 mg IntraVENous Q24H    insulin lispro  0-6 Units SubCUTAneous TID WC    insulin lispro  0-3 Units SubCUTAneous Nightly     PRN Meds: sodium chloride flush, sodium chloride, ondansetron **OR** ondansetron, polyethylene glycol, acetaminophen **OR** acetaminophen, glucose, dextrose, glucagon (rDNA), dextrose      Intake/Output Summary (Last 24 hours) at 11/8/2021 1039  Last data filed at 11/7/2021 2054  Gross per 24 hour   Intake 970 ml   Output 1100 ml   Net -130 ml       Diet:  ADULT DIET; Regular; 4 carb choices (60 gm/meal)    Exam:  BP (!) 130/58   Pulse 92   Temp 98.2 °F (36.8 °C) (Oral)   Resp 22   Wt 195 lb 8 oz (88.7 kg)   SpO2 97%   BMI 27.27 kg/m²     General appearance:  No apparent distress, appears stated age and cooperative. HEENT:  Normocephalic, atraumatic without obvious deformity. Pupils equal, round, and reactive to light. Extra ocular muscles intact. Conjunctivae/corneas clear. Neck: Supple, with full range of motion. No jugular venous distention. Trachea midline. Respiratory:  Normal respiratory effort. Clear to auscultation, bilaterally without Rales/Wheezes/Rhonchi. On 1 lpm O2 via NC. Cardiovascular:  Regular rate and rhythm with normal S1/S2 without murmurs, rubs or gallops. Abdomen: Soft, non-tender, non-distended with normal bowel sounds. Musculoskeletal:  No clubbing, cyanosis or edema bilaterally. Full range of motion without deformity. Skin: Skin color, texture, turgor normal.  No rashes or lesions. Neurologic:  Neurovascularly intact without any focal sensory/motor deficits. Severe bilateral hearing deficits present, pt does not appear to be using any hearing aids at this time. Psychiatric:  Awake and cooperative. Is not oriented to place or time but is oriented to self. Capillary Refill: Brisk,< 3 seconds   Peripheral Pulses: +2 palpable, equal bilaterally      Labs:   Recent Labs     11/07/21  1351 11/08/21  0536   WBC 19.4* 15.3*   HGB 10.7* 9.4*   HCT 34.9* 30.2*    171     Recent Labs     11/07/21  1351 11/08/21  0536    136   K 4.2 4.3   CL 98 101   CO2 18* 24   BUN 15 17   CREATININE 1.2 1.0   CALCIUM 8.2* 7.9*     Recent Labs     11/07/21  1351   *   *   BILITOT 0.8   ALKPHOS 77     No results for input(s): INR in the last 72 hours.   No results for input(s): CKTOTAL, TROPONINI in the last 72 hours. Urinalysis:      Lab Results   Component Value Date    NITRU Negative 11/07/2016    BLOODU Positive 11/07/2016    SPECGRAV 1.020 11/07/2016    GLUCOSEU neg 11/07/2016       Radiology:  No orders to display       Diet: ADULT DIET;  Regular; 4 carb choices (60 gm/meal)    PT/OT Eval Status: following    DVT prophylaxis: [x] Lovenox                                 [] SCDs                                 [] SQ Heparin                                 [] Encourage ambulation           [] Already on Anticoagulation     Disposition:    [x] Home with home health       [] TCU       [] Rehab       [] Psych       [] SNF       [] [place]       [] Other-    Case discussed with Dr. Abelino Venegas    Electronically signed by Vicenta Guerra MD on 11/8/2021 at 10:39 AM

## 2021-11-08 NOTE — PROGRESS NOTES
to pt recently weaned off. Pt 98% on RA at rest, unable to obtain reading after activity due to pulse oximeter error, RN notified, but reports no concerns. Pt denies SOB after mobility. Social/Functional History:  Lives With: Spouse  Type of Home: House  Home Layout: Two level, Able to Live on Main level with bedroom/bathroom  Home Access: Stairs to enter with rails  Entrance Stairs - Number of Steps: 2 steps with 1 rail  Home Equipment: Cane, Rolling walker   Bathroom Shower/Tub: Walk-in shower, Shower chair with back  Bathroom Toilet: Standard  Bathroom Equipment: Grab bars in shower    Receives Help From: Family  ADL Assistance: Independent  Homemaking Assistance: Needs assistance (wife assists with cooking and clothing management)  Homemaking Responsibilities: Yes (shares with spouse)  Ambulation Assistance: Independent  Transfer Assistance: Independent    Active : No     Additional Comments: Pt reports that he did not use AD in PLOF generally, but sometimes used a cane to walk to the mailbox or when walking into the kitchen. VISION:Corrected - wears glasses at home    HEARING:  Pt very [place] - reports he uses amplifiers at home    COGNITION: Slow Processing, Decreased Recall and Impaired Memory    RANGE OF MOTION:  Bilateral Upper Extremity:  WFL    STRENGTH:  Bilateral Upper Extremity:  WFL    SENSATION:   WFL    ADL:   Grooming: Stand By Assistance. for hand hygiene at sink with standing tolerance of approx 45 seconds for task  Toileting: Stand By Assistance. Toilet Transfer: Contact Guard Assistance. Edil Jamil BALANCE:  Sitting Balance:  Stand By Assistance. Standing Balance: Contact Guard Assistance. BED MOBILITY:  Sit to Supine: Stand By Assistance, with head of bed flat      TRANSFERS:  Sit to Stand:  [employer], to/from chair with arms. Stand to Sit: Contact Guard Assistance. FUNCTIONAL MOBILITY:  Assistive Device: None  Assist Level:  Contact Guard Assistance. Distance: To and from bathroom  Pt slightly unsteady, no LOB. Pt observed to reach out for bed rail, tray table, door handle, etc.     Activity Tolerance:  Patient tolerance of  treatment: good. Assessment:  Assessment: Patient presents with decreased endurance, balance, and safety awareness resulting in overall decreased participation in all self care, transfer, and mobility tasks. Pt currently required CGA for all mobility and SBA for self care tasks and would benefit from skilled OT services throughout admission and after discharge to increase safety and independence needed to return to [facility]. Without skilled OT services pt is at increased risk of falls and caregiver burden. Performance deficits / Impairments: Decreased functional mobility , Decreased safe awareness, Decreased endurance, Decreased strength, Decreased balance  Prognosis: Good  REQUIRES OT FOLLOW UP: Yes    Treatment Initiated: Treatment and education initiated within context of evaluation. Evaluation time included review of current medical information, gathering information related to past medical, social and functional history, completion of standardized testing, formal and informal observation of tasks, assessment of data and development of plan of care and goals. Treatment time included skilled education and facilitation of tasks to increase safety and independence with ADL's for improved functional independence and quality of life. Discharge Recommendations:  Continue to assess pending progress, Patient would benefit from continued therapy after discharge, Home with Home health OT    Patient Education:  OT Education: OT Role, Plan of Care    Equipment Recommendations:  Equipment Needed: No (continue to monitor for needs)    Plan:  Times per week: 5x  Current Treatment Recommendations: Self-Care / ADL, Functional Mobility Training, Endurance Training, Strengthening.   See long-term goal time frame for expected duration of plan of care.  If no long-term goals established, a short length of stay is anticipated. Goals:  Patient goals : Did not state  Short term goals  Time Frame for Short term goals: By discharge  Short term goal 1: Pt to increase activity tolerance to/from BR and HH distances with SBA and good safety awareness to increase indep in accessing home environment. Short term goal 2: Patient to complete dynamic standing task x5 minutes with 2UE release and SBA to increase indep in sinkside grooming. Short term goal 3: Patient to complete BADL routine with SBA to increase indep ADL completion. Short term goal 4: Patient to complete BUE mod resistive HEP with min cues for technique to increase strength and endurance needed for all mobility and ADL tasks. Following session, patient left in safe position with all fall risk precautions in place.

## 2021-11-08 NOTE — CARE COORDINATION
11/8/21, 11:28 AM EST  DISCHARGE PLANNING EVALUATION:    Pamella Castaneda       Admitted: 11/7/2021/ Jean-Pierre day: 1   Location: [facility]/001[facility]A Reason for admit: Pneumonia [J18.9]   PMH:  has a past medical history of ASCVD (arteriosclerotic cardiovascular disease), Bipolar 1 disorder ([address] GERD (gastroesophageal reflux disease), Hyperlipidemia, Hypertension, Osteoarthritis, and Type II or unspecified type diabetes mellitus without mention of complication, not stated as uncontrolled. Barriers to Discharge: From [facility] AT [facility]. Pneumonia. Elevated WBC; monitor. Oxygen 2L, IV AB continued  PCP: Pam Decker MD  Readmission Risk Score: 13.3 ( )%    Patient Goals/Plan/Treatment Preferences: plans home w spouse, possible new HH; SW following; await therapy recommendations; monitor for home oxygen eval if not weaned off; will ask Attending  Transportation/Food Security/Housekeeping Addressed:  No issues identified.

## 2021-11-08 NOTE — PROGRESS NOTES
[facility] PHYSICAL THERAPY  EVALUATION  [facility] ICU STEPDOWN TELEMETRY 4K - 4K-01/001-A    Time In: 0995  Time Out: 1150  Timed Code Treatment Minutes: 15 Minutes  Minutes: 28          Date: 2021  Patient Name: Donny Dias,  Gender:  male        MRN: [id number]  : 1945  (67 y.o.)      Referring Practitioner: Faizan Kiser MD  Diagnosis: Pneumonia  Additional Pertinent Hx: Per H&P, \"70 y.o. male with past history of CAD, HTN, HLD, GERD, OA, Bipolar disorder, who is direct admitted to [facility] from [facility] for AMS, sepsis, and pneumonia. Per family, patient was found moaning and confused at home, was taken to ED for evaluation. Family states patient does not have any history of chronic respiratory disease, has not had recent illness or been around known sick contacts, and has not had any recent fevers. They deny any history of pneumonia in the past. They state patient is not vaccinated against COVID or flu. Nursing note as follows Solomon Lucero, Dr Ann Milton. 65yo with bilat lower lobe pneumonia, covid negative. CXR -,, infiltrates showed up on CT abd and pelvis. Having some confusion, delirium, head CT negative. Vomited x1, AST,ALT slightly elevated, CT abdomen negative. WBC 10.5, lactate 8.3, hgb 12.5,trop and BNP wnl, sodium 127, glucose 153. Urine OK. Vitals 96.3F, 154/80,118,89% on room air, 100% on 2 L NC.\"     Restrictions/Precautions:  Restrictions/Precautions: Fall Risk, General Precautions    Subjective:  Chart Reviewed: Yes  Patient assessed for rehabilitation services?: Yes  Subjective: Pt resting in bed and agrees to therapy.     General:  Overall Orientation Status: Within Functional Limits    Vision: Impaired  Vision Exceptions: Wears glasses for reading    Hearing: Exceptions to [facility]  Hearing Exceptions: Hard of hearing/hearing concerns         Pain: denies      Vitals: Vitals not assessed per clinical judgement, see nursing flowsheet    Social/Functional History:    Lives With: Spouse  Type of Home: House  Home Layout: Two level, Able to Live on Main level with bedroom/bathroom  Home Access: Stairs to enter with rails  Entrance Stairs - Number of Steps: 2 steps with 1 rail  Home Equipment: Cane, Rolling walker          Receives Help From: Family        Ambulation Assistance: Independent  Transfer Assistance: Independent          Additional Comments: Pt reports that he did not use AD in PLOF generally, but sometimes used a cane to walk to the mailbox. OBJECTIVE:  Range of Motion:  Bilateral Lower Extremity: WFL    Strength:  Bilateral Lower Extremity: grossly 4/5    Balance:  Static Sitting Balance:  Supervision  Dynamic Sitting Balance: Stand By Assistance  Static Standing Balance: Stand By Assistance  Dynamic Standing Balance: Contact Guard Assistance    Bed Mobility:  Supine to Sit: Minimal Assistance    Transfers:  Sit to Stand: Contact Guard Assistance  Stand to Sit:Contact Guard Assistance    Ambulation:  Contact Guard Assistance  Distance: 20 ft x 2  Surface: Level Tile  Device:No Device  Gait Deviations: Forward Flexed Posture, Decreased Step Length Bilaterally, Decreased Gait Speed and Decreased Heel Strike Bilaterally    Exercise:  Patient was guided in 1 set(s) 10 reps of exercise to both lower extremities. Glut sets, Quad sets, Hip abduction/adduction, Seated marches, Seated heel/toe raises and Long arc quads. Exercises were completed for increased independence with functional mobility. Functional Outcome Measures: Completed  -PAC Inpatient Mobility Raw Score : 19  -PAC Inpatient T-Scale Score : 45.44    ASSESSMENT:  Activity Tolerance:  Patient tolerance of  treatment: good. Treatment Initiated: Treatment and education initiated within context of evaluation.   Evaluation time included review of current medical information, gathering information related to past medical, social and functional history, completion of standardized testing, formal and informal observation of tasks, assessment of data and development of plan of care and goals. Treatment time included skilled education and facilitation of tasks to increase safety and independence with functional mobility for improved independence and quality of life. Assessment: Body structures, Functions, Activity limitations: Decreased functional mobility , Decreased strength, Decreased balance, Decreased endurance  Assessment: Pt is a 66 yo male that is mildly deconditioned and moving with close SBA to light CGA for safety. Pt participated well with exercises and functional mobility. Pt would benefit from continued skilled PT to address strengthening, endurance building, and functional mobility. Prognosis: Good    REQUIRES PT FOLLOW UP: Yes    Discharge Recommendations:  Discharge Recommendations: Continue to assess pending progress, Home with Home health PT    Patient Education:  PT Education: Goals, PT Role, Plan of Care, Home Exercise Program    Equipment Recommendations:  Equipment Needed: No    Plan:  Times per week: 5x GM  Current Treatment Recommendations: Strengthening, Gait Training, Stair training, Balance Training, Endurance Training, Functional Mobility Training, Transfer Training, Safety Education & Training, Home Exercise Program, Patient/Caregiver Education & Training    Goals:  Patient goals : go home  Short term goals  Time Frame for Short term goals: at discharge  Short term goal 1: Pt to be Mod I for supine <> sit to get in/out of bed  Short term goal 2: Pt to be Mod I for sit <> stand to get up to ambulate  Short term goal 3: Pt to ambulate > 60 ft with no AD with Supervision for household distances  Short term goal 4: Pt to negotiate 2 steps with 1 rail with SBA for home access  Long term goals  Time Frame for Long term goals : not set due to short ELOS    Following session, patient left in safe position with all fall risk precautions in place. Rolan Smith.  Stef Raines, Elyse [place] [address]

## 2021-11-08 NOTE — PLAN OF CARE
Problem: Discharge Planning:  Goal: Discharged to appropriate level of care  Description: Discharged to appropriate level of care  Outcome: Completed   SW consult received and completed. See  note 11/8.

## 2021-11-08 NOTE — CARE COORDINATION
DISCHARGE/PLANNING EVALUATION  11/8/21, 9:17 AM EST    Reason for Referral: discharge planning  Mental Status: sleeping, confused, has telesitter  Decision Making: self/family  Family/Social/Home Environment: lives at home with wife. Pt has 4 children, 2 live locally. Patient was independent in his personal cares prior to admission and assisted with household chore  Current Services including food security, transportation and housekeeping: wife does the cooking. Wife does local transportation, children assist with longer transports  Current Equipment:canes, shower chair  Payment Source:Medicare  Concerns or Barriers to Discharge: none  Post acute provider list with quality measures, geographic area and applicable managed care information provided. Questions regarding selection process answered:provided     Teach Back Method used with wife Ilene Ann regarding care plan and needs  Wife verbalizes understanding of the plan of care and contribute to goal setting. Patient goals, treatment preferences and discharge plan: Return home with wife with home health if needed. SW spoke with wife on the phone, pt sleeping and has telesitter at bedside. Wife reports pt uses a cane as needed. Wife assisted with cooking, medications set up. Wife open to home health if needed. SW provided [facility] list at bedside, wife plans to be here tomorrow.  NANCY provided SW phone number if she has further concerns/questions    Electronically signed by Kavya Marie on 11/8/2021 at 9:17 AM

## 2021-11-09 ENCOUNTER — HOSPITAL ENCOUNTER (INPATIENT)
Age: 76
LOS: 3 days | Discharge: NURSING, REHAB OR LONG-TERM CARE | DRG: 071 | End: 2021-11-13
Attending: INTERNAL MEDICINE | Admitting: HOSPITALIST
Payer: MEDICARE

## 2021-11-09 ENCOUNTER — APPOINTMENT (OUTPATIENT)
Dept: CT IMAGING | Age: 76
DRG: 071 | End: 2021-11-09
Payer: MEDICARE

## 2021-11-09 ENCOUNTER — APPOINTMENT (OUTPATIENT)
Dept: GENERAL RADIOLOGY | Age: 76
DRG: 071 | End: 2021-11-09
Payer: MEDICARE

## 2021-11-09 VITALS
RESPIRATION RATE: 16 BRPM | DIASTOLIC BLOOD PRESSURE: 67 MMHG | WEIGHT: 191.8 LBS | HEART RATE: 95 BPM | BODY MASS INDEX: 26.75 KG/M2 | OXYGEN SATURATION: 93 % | SYSTOLIC BLOOD PRESSURE: 123 MMHG | TEMPERATURE: 98.2 F

## 2021-11-09 DIAGNOSIS — S02.2XXA CLOSED FRACTURE OF NASAL BONE, INITIAL ENCOUNTER: ICD-10-CM

## 2021-11-09 DIAGNOSIS — S09.90XA INJURY OF HEAD, INITIAL ENCOUNTER: Primary | ICD-10-CM

## 2021-11-09 LAB
ALBUMIN SERPL-MCNC: 3.2 G/DL (ref 3.5–5.1)
ALP BLD-CCNC: 91 U/L (ref 38–126)
ALT SERPL-CCNC: 118 U/L (ref 11–66)
ANION GAP SERPL CALCULATED.3IONS-SCNC: 15 MEQ/L (ref 8–16)
AST SERPL-CCNC: 44 U/L (ref 5–40)
BACTERIA: ABNORMAL /HPF
BASOPHILS # BLD: 0.2 %
BASOPHILS ABSOLUTE: 0 THOU/MM3 (ref 0–0.1)
BILIRUB SERPL-MCNC: 0.4 MG/DL (ref 0.3–1.2)
BILIRUBIN URINE: NEGATIVE
BLOOD, URINE: ABNORMAL
BUN BLDV-MCNC: 17 MG/DL (ref 7–22)
CALCIUM SERPL-MCNC: 8.9 MG/DL (ref 8.5–10.5)
CASTS 2: ABNORMAL /LPF
CASTS UA: ABNORMAL /LPF
CHARACTER, URINE: CLEAR
CHLORIDE BLD-SCNC: 97 MEQ/L (ref 98–111)
CO2: 19 MEQ/L (ref 23–33)
COLOR: YELLOW
CREAT SERPL-MCNC: 0.8 MG/DL (ref 0.4–1.2)
CRYSTALS, UA: ABNORMAL
EOSINOPHIL # BLD: 0.1 %
EOSINOPHILS ABSOLUTE: 0 THOU/MM3 (ref 0–0.4)
EPITHELIAL CELLS, UA: ABNORMAL /HPF
ERYTHROCYTE [DISTWIDTH] IN BLOOD BY AUTOMATED COUNT: 15.7 % (ref 11.5–14.5)
ERYTHROCYTE [DISTWIDTH] IN BLOOD BY AUTOMATED COUNT: 47.8 FL (ref 35–45)
GFR SERPL CREATININE-BSD FRML MDRD: > 90 ML/MIN/1.73M2
GLUCOSE BLD-MCNC: 118 MG/DL (ref 70–108)
GLUCOSE BLD-MCNC: 124 MG/DL (ref 70–108)
GLUCOSE BLD-MCNC: 188 MG/DL (ref 70–108)
GLUCOSE URINE: 100 MG/DL
HCT VFR BLD CALC: 33.9 % (ref 42–52)
HEMOGLOBIN: 10.2 GM/DL (ref 14–18)
IMMATURE GRANS (ABS): 0.16 THOU/MM3 (ref 0–0.07)
IMMATURE GRANULOCYTES: 1.6 %
KETONES, URINE: NEGATIVE
LEUKOCYTE ESTERASE, URINE: NEGATIVE
LYMPHOCYTES # BLD: 7.4 %
LYMPHOCYTES ABSOLUTE: 0.7 THOU/MM3 (ref 1–4.8)
MCH RBC QN AUTO: 25.5 PG (ref 26–33)
MCHC RBC AUTO-ENTMCNC: 30.1 GM/DL (ref 32.2–35.5)
MCV RBC AUTO: 84.8 FL (ref 80–94)
MISCELLANEOUS 2: ABNORMAL
MONOCYTES # BLD: 7.6 %
MONOCYTES ABSOLUTE: 0.7 THOU/MM3 (ref 0.4–1.3)
MRSA SCREEN: NORMAL
NITRITE, URINE: NEGATIVE
NUCLEATED RED BLOOD CELLS: 0 /100 WBC
OSMOLALITY CALCULATION: 265.6 MOSMOL/KG (ref 275–300)
PH UA: 8.5 (ref 5–9)
PLATELET # BLD: 151 THOU/MM3 (ref 130–400)
PMV BLD AUTO: 9.7 FL (ref 9.4–12.4)
POTASSIUM REFLEX MAGNESIUM: 3.6 MEQ/L (ref 3.5–5.2)
PROTEIN UA: 30
RBC # BLD: 4 MILL/MM3 (ref 4.7–6.1)
RBC URINE: ABNORMAL /HPF
RENAL EPITHELIAL, UA: ABNORMAL
SEG NEUTROPHILS: 83.1 %
SEGMENTED NEUTROPHILS ABSOLUTE COUNT: 8.1 THOU/MM3 (ref 1.8–7.7)
SODIUM BLD-SCNC: 131 MEQ/L (ref 135–145)
SPECIFIC GRAVITY, URINE: 1.02 (ref 1–1.03)
TOTAL PROTEIN: 6.8 G/DL (ref 6.1–8)
UROBILINOGEN, URINE: 2 EU/DL (ref 0–1)
WBC # BLD: 9.8 THOU/MM3 (ref 4.8–10.8)
WBC UA: ABNORMAL /HPF
YEAST: ABNORMAL

## 2021-11-09 PROCEDURE — 82948 REAGENT STRIP/BLOOD GLUCOSE: CPT

## 2021-11-09 PROCEDURE — 72170 X-RAY EXAM OF PELVIS: CPT

## 2021-11-09 PROCEDURE — 85025 COMPLETE CBC W/AUTO DIFF WBC: CPT

## 2021-11-09 PROCEDURE — 70450 CT HEAD/BRAIN W/O DYE: CPT

## 2021-11-09 PROCEDURE — 94640 AIRWAY INHALATION TREATMENT: CPT

## 2021-11-09 PROCEDURE — 81001 URINALYSIS AUTO W/SCOPE: CPT

## 2021-11-09 PROCEDURE — 97116 GAIT TRAINING THERAPY: CPT

## 2021-11-09 PROCEDURE — 97110 THERAPEUTIC EXERCISES: CPT

## 2021-11-09 PROCEDURE — 99223 1ST HOSP IP/OBS HIGH 75: CPT | Performed by: HOSPITALIST

## 2021-11-09 PROCEDURE — 94760 N-INVAS EAR/PLS OXIMETRY 1: CPT

## 2021-11-09 PROCEDURE — 72125 CT NECK SPINE W/O DYE: CPT

## 2021-11-09 PROCEDURE — 99285 EMERGENCY DEPT VISIT HI MDM: CPT

## 2021-11-09 PROCEDURE — 80053 COMPREHEN METABOLIC PANEL: CPT

## 2021-11-09 PROCEDURE — 93005 ELECTROCARDIOGRAM TRACING: CPT | Performed by: INTERNAL MEDICINE

## 2021-11-09 PROCEDURE — 6370000000 HC RX 637 (ALT 250 FOR IP): Performed by: STUDENT IN AN ORGANIZED HEALTH CARE EDUCATION/TRAINING PROGRAM

## 2021-11-09 PROCEDURE — 2580000003 HC RX 258: Performed by: STUDENT IN AN ORGANIZED HEALTH CARE EDUCATION/TRAINING PROGRAM

## 2021-11-09 PROCEDURE — 36415 COLL VENOUS BLD VENIPUNCTURE: CPT

## 2021-11-09 PROCEDURE — 83935 ASSAY OF URINE OSMOLALITY: CPT

## 2021-11-09 PROCEDURE — 6360000002 HC RX W HCPCS: Performed by: STUDENT IN AN ORGANIZED HEALTH CARE EDUCATION/TRAINING PROGRAM

## 2021-11-09 PROCEDURE — 97530 THERAPEUTIC ACTIVITIES: CPT

## 2021-11-09 PROCEDURE — 97535 SELF CARE MNGMENT TRAINING: CPT

## 2021-11-09 RX ORDER — AZITHROMYCIN 250 MG/1
250 TABLET, FILM COATED ORAL DAILY
Qty: 2 TABLET | Refills: 0 | Status: ON HOLD | OUTPATIENT
Start: 2021-11-10 | End: 2021-11-13 | Stop reason: HOSPADM

## 2021-11-09 RX ORDER — CEFDINIR 300 MG/1
300 CAPSULE ORAL 2 TIMES DAILY
Qty: 16 CAPSULE | Refills: 0 | Status: SHIPPED | OUTPATIENT
Start: 2021-11-09 | End: 2021-11-17

## 2021-11-09 RX ADMIN — TRIAMTERENE AND HYDROCHLOROTHIAZIDE 1 TABLET: 37.5; 25 TABLET ORAL at 08:01

## 2021-11-09 RX ADMIN — AZITHROMYCIN MONOHYDRATE 250 MG: 250 TABLET ORAL at 08:01

## 2021-11-09 RX ADMIN — CETIRIZINE HYDROCHLORIDE 10 MG: 10 TABLET, FILM COATED ORAL at 08:01

## 2021-11-09 RX ADMIN — PANTOPRAZOLE SODIUM 40 MG: 40 TABLET, DELAYED RELEASE ORAL at 06:37

## 2021-11-09 RX ADMIN — IPRATROPIUM BROMIDE AND ALBUTEROL SULFATE 1 AMPULE: .5; 3 SOLUTION RESPIRATORY (INHALATION) at 08:08

## 2021-11-09 RX ADMIN — ASPIRIN 81 MG: 81 TABLET, FILM COATED ORAL at 08:01

## 2021-11-09 RX ADMIN — IPRATROPIUM BROMIDE AND ALBUTEROL SULFATE 1 AMPULE: .5; 3 SOLUTION RESPIRATORY (INHALATION) at 14:22

## 2021-11-09 RX ADMIN — INSULIN LISPRO 1 UNITS: 100 INJECTION, SOLUTION INTRAVENOUS; SUBCUTANEOUS at 12:36

## 2021-11-09 RX ADMIN — SODIUM CHLORIDE, PRESERVATIVE FREE 10 ML: 5 INJECTION INTRAVENOUS at 08:01

## 2021-11-09 RX ADMIN — DIVALPROEX SODIUM 500 MG: 500 TABLET, DELAYED RELEASE ORAL at 08:00

## 2021-11-09 RX ADMIN — ENOXAPARIN SODIUM 40 MG: 40 INJECTION SUBCUTANEOUS at 08:00

## 2021-11-09 ASSESSMENT — PAIN SCALES - GENERAL: PAINLEVEL_OUTOF10: 0

## 2021-11-09 NOTE — CARE COORDINATION
DISCHARGE PLANNING UPDATE    Barriers to Discharge: Pneumonia/Bacteremia.      Discharge Plan:   plans home w spouse, new HH (nsg, therapy); SW following; therapy recommends [facility]

## 2021-11-09 NOTE — PROGRESS NOTES
[facility]  INPATIENT PHYSICAL THERAPY  DAILY NOTE  STR ICU STEPDOWN TELEMETRY 4K - 4K-01/001-A     Time In: 1046  Time Out: 1109  Timed Code Treatment Minutes: 23 Minutes  Minutes: 23          Date: 2021  Patient Name: Joseph Johnson,  Gender:  male        MRN: [id number]  : 1945  (67 y.o.)     Referring Practitioner: Justin Aguilar MD  Diagnosis: Pneumonia  Additional Pertinent Hx: Per H&P, \"70 y.o. male with past history of CAD, HTN, HLD, GERD, OA, Bipolar disorder, who is direct admitted to [facility] from [facility] [place] [facility] for AMS, sepsis, and pneumonia. Per family, patient was found moaning and confused at home, was taken to ED for evaluation. Family states patient does not have any history of chronic respiratory disease, has not had recent illness or been around known sick contacts, and has not had any recent fevers. They deny any history of pneumonia in the past. They state patient is not vaccinated against COVID or flu. Nursing note as follows Ashok Faulkner, Dr Vasyl Andrews. 65yo with bilat lower lobe pneumonia, covid negative. CXR -,, infiltrates showed up on CT abd and pelvis. Having some confusion, delirium, head CT negative. Vomited x1, AST,ALT slightly elevated, CT abdomen negative. WBC 10.5, lactate 8.3, hgb 12.5,trop and BNP wnl, sodium 127, glucose 153. Urine OK.  Vitals 96.3F, 154/80,118,89% on room air, 100% on 2 L NC.\"     Prior Level of Function:  Lives With: Spouse  Type of Home: House  Home Layout: Two level, Able to Live on Main level with bedroom/bathroom  Home Access: Stairs to enter with rails  Entrance Stairs - Number of Steps: 2 steps with 1 rail  Home Equipment: Cane, Rolling walker   Bathroom Shower/Tub: Walk-in shower, Shower chair with back  [employer]: Standard  Bathroom Equipment: Grab bars in shower    Receives Help From: Family  ADL Assistance: Independent  Homemaking Assistance: Needs assistance (wife assists with cooking and clothing management)  Homemaking Responsibilities: Yes (shares with spouse)  Ambulation Assistance: Independent  Transfer Assistance: Independent  Active : No  Additional Comments: Pt reports that he did not use AD in PLOF generally, but sometimes used a cane to walk to the mailbox or when walking into the kitchen. Restrictions/Precautions:  Restrictions/Precautions: Fall Risk, General Precautions      SUBJECTIVE: Pt resting in chair and spouse present. Both agree to therapy. PAIN: not reported      Vitals: Vitals not assessed per clinical judgement, see nursing flowsheet    OBJECTIVE:  Bed Mobility:  Not Tested    Transfers:  Sit to Stand: Stand By Assistance  Stand to Sit:Stand By Assistance    Ambulation:  Contact Guard Assistance  Distance: 100 ft  Surface: Level Tile  Device:Hand-Held Assist and occasional benavidez rail. Rec use of RW or cane at home  Gait Deviations:  Decreased Step Length Bilaterally and Decreased Gait Speed    Balance:  Static Sitting Balance:  Modified Independent  Dynamic Sitting Balance: Supervision  Static Standing Balance: Stand By Assistance  Dynamic Standing Balance: Stand By Assistance, Contact Guard Assistance    Exercise:  Patient was guided in 1 set(s) 10 reps of exercise to both lower extremities. Ankle pumps, Hip abduction/adduction, Seated marches, Seated heel/toe raises and Long arc quads. Exercises were completed for increased independence with functional mobility. Functional Outcome Measures: Completed  AM-PAC Inpatient Mobility Raw Score : 19  AM-PAC Inpatient T-Scale Score : 45.44    ASSESSMENT:  Assessment: Patient progressing toward established goals. Activity Tolerance:  Patient tolerance of  treatment: good.        Equipment Recommendations:Equipment Needed: No  Discharge Recommendations: Home with Home Health PT  Plan: Times per week: 5x GM  Current Treatment Recommendations: Strengthening, Gait Training, Stair training, Balance Training, Endurance Training, Functional Mobility Training, Transfer Training, Safety Education & Training, Home Exercise Program, Patient/Caregiver Education & Training    Patient Education  Patient Education: Plan of Care, Home Exercise Program    Goals:  Patient goals : go home  Short term goals  Time Frame for Short term goals: at discharge  Short term goal 1: Pt to be Mod I for supine <> sit to get in/out of bed  Short term goal 2: Pt to be Mod I for sit <> stand to get up to ambulate  Short term goal 3: Pt to ambulate > 60 ft with no AD with Supervision for household distances  Short term goal 4: Pt to negotiate 2 steps with 1 rail with SBA for home access  Long term goals  Time Frame for Long term goals : not set due to short ELOS    Following session, patient left in safe position with all fall risk precautions in place. Chris Villegas.  Mery [employer], [place] [address]

## 2021-11-09 NOTE — DISCHARGE SUMMARY
DISCHARGE SUMMARY      Patient Identification:   Haily Costa   :   MRN: [id number]   Account: [de-identified]      Patient's PCP: Addison Gilbert MD    Admit Date: 2021     Discharge Date:   2021    Admitting Physician: Shaniqua Celis MD     Discharge Physician: Cesar Silva MD     Discharge Diagnoses:    Acute hypoxic respiratory failure - improving  Azithromycin for 2 days after discharge  Cefdinir for 8 days after discharge  Home health with PT/OT  Follow up with PCP within 2 weeks after discharge     Sepsis secondary to bilateral pneumonia - improving  See #1     Acute encephalopathy - resolved     Lactic acidosis - improved     Anion gap metabolic acidosis - resolved     HTN     NIDDM2, controlled     Bipolar disorder  CAD  HLD  GERD  OA  Chronic bilateral hearing loss  -Continue home meds    The patient was seen and examined on day of discharge and this discharge summary is in conjunction with any daily progress note from day of discharge. Hospital Course:   Haily Costa is a 67 y.o. male admitted to [address] [place] [address] on 2021 for altered mental status, sepsis and pneumonia. Patient found confused at home on the floor by wife. Patient taken to ED for evaluation. No recent illness or known sick contacts, no recent fevers. Not vaccinated against COVID or flu. Nursing note as follows Kenneth [place], Dr Powers. 65yo with bilat lower lobe pneumonia, covid negative. CXR -,, infiltrates showed up on CT abd and pelvis. Having some confusion, delirium, head CT negative. Vomited x1, AST,ALT slightly elevated, CT abdomen negative. WBC 10.5, lactate 8.3, hgb 12.5,trop and BNP wnl, sodium 127, glucose 153. Urine OK. Vitals 96.3F, 154/80,118,89% on room air, 100% on 2 L NC.\"    2021  Clinically improved    2021  Significant improvement. AAOx3. Wife states he is at mental baseline and close to physical baseline.     he is in overall stable and improved condition as compared to on admission, has responded well to management while admitted. Please see above for details regarding specific diagnoses. he is advised to follow up with PCP in 1-2 weeks as OP. Counseled to continue to monitor his symptoms and present to ED if any acute worsening of symptoms or development of new severe symptoms. he is being discharged to home in stable condition at this time. Plan of care discussed with patient and RN. Exam:     Vitals:  Vitals:    11/09/21 0305 11/09/21 0754 11/09/21 0808 11/09/21 1155   BP: (!) 142/72 134/67  123/67   Pulse: 97 99  95   Resp: 18 18 16 16   Temp: 98.3 °F (36.8 °C) 98.2 °F (36.8 °C)  98.2 °F (36.8 °C)   TempSrc: Oral Oral  Oral   SpO2: 95% 96% 95% 93%   Weight: 191 lb 12.8 oz (87 kg)        Weight: Weight: 191 lb 12.8 oz (87 kg)     24 hour intake/output:    Intake/Output Summary (Last 24 hours) at 11/9/2021 1412  Last data filed at 11/9/2021 0307  Gross per 24 hour   Intake 986.04 ml   Output 300 ml   Net 686.04 ml       Physical Exam  Vitals and nursing note reviewed. Constitutional:       General: He is not in acute distress. Appearance: He is well-developed. He is not diaphoretic. HENT:      Head: Normocephalic and atraumatic. Right Ear: External ear normal.      Left Ear: External ear normal.   Eyes:      General: No scleral icterus. Right eye: No discharge. Left eye: No discharge. Cardiovascular:      Rate and Rhythm: Normal rate and regular rhythm. Heart sounds: Normal heart sounds. No murmur heard. Pulmonary:      Effort: Pulmonary effort is normal.      Breath sounds: Normal breath sounds. Musculoskeletal:      Cervical back: Normal range of motion. Skin:     General: Skin is warm and dry. Findings: No erythema or rash. Neurological:      Mental Status: He is alert and oriented to person, place, and time. Cranial Nerves: No cranial nerve deficit.    Psychiatric:         Behavior: Behavior normal. Thought Content: Thought content normal.         Judgment: Judgment normal.           Labs: For convenience and continuity at follow-up the following most recent labs are provided:      CBC:    Lab Results   Component Value Date    WBC 15.3 11/08/2021    HGB 9.4 11/08/2021    HCT 30.2 11/08/2021     11/08/2021       Renal:    Lab Results   Component Value Date     11/08/2021    K 4.3 11/08/2021     11/08/2021    CO2 24 11/08/2021    BUN 17 11/08/2021    CREATININE 1.0 11/08/2021    CALCIUM 7.9 11/08/2021         Significant Diagnostic Studies    Radiology:   No orders to display          Consults:     IP CONSULT TO SOCIAL WORK  IP CONSULT TO HOME CARE NEEDS    Disposition:    [x] Home       [] TCU       [] Rehab       [] Psych       [] SNF       [] [place]       [] Other-    Condition at Discharge: Stable    Code Status:  Full Code     Patient Instructions: Activity: activity as tolerated  Diet: ADULT DIET; Regular; 4 carb choices (60 gm/meal)      Follow-up visits:   Xochitl Garduno MD  [address] [phone]    Go on 11/16/2021  Your appointment time is at 11:15,, Bring medications, photo ID, & insurance card, Please arrive 15 minutes prior to appointment time    [address]  [phone]           Time Spent on discharge is more than 20 minutes in the examination, evaluation, counseling and review of medications and discharge plan. Signed: Thank you Xochitl Garduno MD for the opportunity to be involved in this patient's care.     Electronically signed by John Hart MD on 11/9/2021 at 2:12 PM

## 2021-11-09 NOTE — PROGRESS NOTES
Discharge teaching and instructions for diagnosis/procedure of pneumonia completed with patient using teachback method. AVS reviewed. Printed prescriptions given to patient. Patient voiced understanding regarding prescriptions, follow up appointments, and care of self at home.  Discharged in a wheelchair to  home with support per family

## 2021-11-09 NOTE — PROGRESS NOTES
[facility] ICU STEPDOWN TELEMETRY 4K  Occupational Therapy  Daily Note  Time:    Time In: 1440  Time Out: 9391  Timed Code Treatment Minutes: 45 Minutes  Minutes: 38          Date: 2021  Patient Name: Familia Jo,   Gender: male      Room: [facility]001-A  MRN: [id number]  : 1945  (67 y.o.)  Referring Practitioner: Zaire Smith MD  Diagnosis: Pneumonia  Additional Pertinent Hx: Per H&P, \"70 y.o. male with past history of CAD, HTN, HLD, GERD, OA, Bipolar disorder, who is direct admitted to [facility] from [facility] for AMS, sepsis, and pneumonia. Per family, patient was found moaning and confused at home, was taken to ED for evaluation. Family states patient does not have any history of chronic respiratory disease, has not had recent illness or been around known sick contacts, and has not had any recent fevers. They deny any history of pneumonia in the past. They state patient is not vaccinated against COVID or flu. Nursing note as follows Dr Belkis Mahajan Fitting. 67yo with bilat lower lobe pneumonia, covid negative. CXR -,, infiltrates showed up on CT abd and pelvis. Having some confusion, delirium, head CT negative. Vomited x1, AST,ALT slightly elevated, CT abdomen negative. WBC 10.5, lactate 8.3, hgb 12.5,trop and BNP wnl, sodium 127, glucose 153. Urine OK. Vitals 96.3F, 154/80,118,89% on room air, 100% on 2 L NC.\"    Restrictions/Precautions:  Restrictions/Precautions: Fall Risk, General Precautions      SUBJECTIVE: Pleasant and cooperative. Pt agreed to get dressed and to demonstrate walking with the walker. Pt's family described his home environment. Safety with functioning at home was discussed. Use of the gait belt also was discussed and demonstrated for pt's wife and brother. PAIN: No reports of pain.    Vitals: Vitals not assessed per clinical judgement, see nursing flowsheet    COGNITION: Slow Processing, Decreased Problem Solving, and Decreased Safety Awareness    ADL: Upper Extremity Dressing: Minimal Assistance. Pt had help with keeping his balance while sitting at the edge of bed  Lower Extremity Dressing: Moderate Assistance. Help needed for doffing slipper socks and donning his socks and shoes; pt also donned pajama pants- he did pull them up after having help getting his feet into the pantlegs . BALANCE:  Sitting Balance:  Minimal Assistance. Several LOB noted toward his L side while doing his ADL. Verbal cues provided for keeping his midline  Standing Balance: Minimal Assistance. Finishing his lower body dressing with cues to be as tall as possible while using the walker    BED MOBILITY:  Supine to Sit: Minimal Assistance with the head of the bed flat while using the bedrail  Sit to Supine: Contact Guard Assistance pt came to his back while at the edge of bed with assist to avoid lowering his shoulders quickly and hitting his head against the bedrail  Scooting: [employer], with verbal cues , with increased time for completion      TRANSFERS:  Sit to Stand:  [employer], with increased time for completion, cues for hand placement. From the edge of bed  Stand to Sit: [employer]. To the recliner chair with cues to reach back for the armrests    FUNCTIONAL MOBILITY:  Assistive Device: Walker  Assist Level:  Contact Guard Assistance. Distance:  10 ft around the bed  Cues provided stay close to the walker at all times as he advanced the walker. Small steps noted    ASSESSMENT:    Pt was making steady progresss toward his goal of being able to return home with help from his family. He was using either a cane or standard walker prior to admission and is using the standard walker today. CGA needed for safe use of the walker. He needed MIN A for dressing secondary to difficulty keeping his balance while doing the self care activity at the edge of bed. MOD A needed for lower body dressing.   Pt needed cues for safety and for problem solving at this time. Pt has help from family members as needed. He has fair endurance at this time. Activity Tolerance:  Patient tolerance of  treatment: fair. Pt did show signs of fatigue while sitting at the edge of bed and doing an ADL. Discharge Recommendations: Home with Home Health OT  Equipment Recommendations: Equipment Needed: No (continue to monitor for needs)  Plan: Times per week: 5x  Current Treatment Recommendations: Self-Care / ADL, Functional Mobility Training, Endurance Training, Strengthening    Patient Education  Patient Education: Family Education, Reviewed Prior Education, and Home Safety    Goals  Short term goals  Time Frame for Short term goals: By discharge  Short term goal 1: Pt to increase activity tolerance to/from BR and [facility] distances with SBA and good safety awareness to increase indep in accessing home environment. Short term goal 2: Patient to complete dynamic standing task x5 minutes with 2UE release and SBA to increase indep in sinkside grooming. Short term goal 3: Patient to complete BADL routine with SBA to increase indep ADL completion. Short term goal 4: Patient to complete BUE mod resistive HEP with min cues for technique to increase strength and endurance needed for all mobility and ADL tasks. Following session, patient left in safe position with all fall risk precautions in place.

## 2021-11-09 NOTE — DISCHARGE INSTR - DIET
Good nutrition is important when healing from an illness, injury, or surgery. Follow any nutrition recommendations given to you during your hospital stay. If you were given an oral nutrition supplement while in the hospital, continue to take this supplement at home. You can take it with meals, in-between meals, and/or before bedtime. These supplements can be purchased at most local grocery stores, pharmacies, and chain super-stores. If you have any questions about your diet or nutrition, call the hospital and ask for the dietitian. You are being placed on a diabetic carb counting diet. Eating healthy is the first step in controlling diabetes    Here's how to get started. ... Eat 3 meals a day. Eat your meals at the same time each day and do not skip meals. Eat about the same amount of food each day. Limit sugar and sweets. Eat less candy, desserts, pastries and jelly. Limit intake of regular pop, sugary beverages and fruit juice. Drink sugar free beverages such as diet pop, water, Crystal Light, and unsweetened tea instead. Use Equal or Sweet-n-Low in place of sugar. Lose weight if you are overweight. Even a small amount of weight loss may help improve your blood sugar control. To help lose weight, reduce your portion sizes. Control your intake of carbohydrates. Carbohydrate is the main  nutrient that affects blood sugar levels. All the carbohydrate you eat is turned  into sugar by your body. Therefore, it is important to control  the amount  of carbohydrate that you eat a day. You should eat about 60-75  grams of  carbohydrate at each meal.        Common sources of carbohydrates:     Eat more fiber. Fiber can help slow down the rise in blood sugar following a meal.  To get more fiber in your diet, eat at least 5 servings of fruits and vegetables a day, choose whole grain bread/cereal and eat more beans or legumes. Reduce your intake of high fat foods.    Cutting back on your intake of high fat food can help reduce body weight and cholesterol levels. Reduce intake of fried food, lozano, sausage, luncheon meat, gravy, sour cream, cheese, egg yolks and margarine/butter. Limit your intake of alcohol. Drink alcohol only with permission of your doctor. Never drink alcohol on an empty stomach. Be more active. Regular exercise is an important part of your diabetes care as exercise can help lower your blood sugar levels. The type and amount of exercise that is right for you should be discussed with your doctor.

## 2021-11-09 NOTE — PROGRESS NOTES
A home oxygen evaluation has been completed. [x]Patient is an inpatient. It is expected that the patient will be discharged within the next 48 hours. Qualified provider to write orders for possible sleep study or home oxygen prescription. Social service/care managers will arrange for home oxygen if ordered. If patient is active, arrange for Home Medical supplier to assess for Oxygen Conserving Device per pulse oximetry. []Patient is an outpatient. Results will be faxed to the ordering provider. Qualified provider to write orders for possible sleep study or home oxygen prescription. Patient was placed on room air for 20 minutes. SpO2 was 95 % on room air at rest. Patients SpO2 was 89% or above and did not qualify for home oxygen. Patient was walked . SpO2 was 90-91 % during walking. Patients SpO2 was 89% or above and did not qualify for home oxygen. Patient does not have a positive pressure airway device at home. Patient is not  diagnosed with Obstructive Sleep Apnea. Patient will not be set up/instructed on a nocturnal study. Results will be given to qualified provider. Per TITA Reed patient does not need nocturnal study. Note: For any SpO2 at 28% see policy and procedure for possible qualifications.

## 2021-11-09 NOTE — PROGRESS NOTES
CLINICAL PHARMACY: DISCHARGE MED RECONCILIATION/REVIEW    [facility] ([facility] Select Patient?: Yes  Total # of Interventions Recommended: 0   -   Total # Interventions Accepted: 0  Intervention Severity:   - Level 1 Intervention Present?: No   - Level 2 #: 0   - Level 3 #: 0   Time Spent (min): 15    Additional Documentation:    Jena HardyD, BCPS   11/9/2021  1:41 PM

## 2021-11-10 ENCOUNTER — APPOINTMENT (OUTPATIENT)
Dept: GENERAL RADIOLOGY | Age: 76
DRG: 071 | End: 2021-11-10
Payer: MEDICARE

## 2021-11-10 PROBLEM — R41.82 ALTERED MENTAL STATUS: Status: ACTIVE | Noted: 2021-11-10

## 2021-11-10 LAB
ABSOLUTE RETIC #: 49 THOU/MM3 (ref 20–115)
EKG ATRIAL RATE: 95 BPM
EKG P AXIS: 50 DEGREES
EKG P-R INTERVAL: 160 MS
EKG Q-T INTERVAL: 386 MS
EKG QRS DURATION: 130 MS
EKG QTC CALCULATION (BAZETT): 485 MS
EKG R AXIS: 60 DEGREES
EKG T AXIS: 28 DEGREES
EKG VENTRICULAR RATE: 95 BPM
FERRITIN: 84 NG/ML (ref 22–322)
FLU A ANTIGEN: NEGATIVE
FLU B ANTIGEN: NEGATIVE
FOLATE: 10 NG/ML (ref 4.8–24.2)
GLUCOSE BLD-MCNC: 102 MG/DL (ref 70–108)
GLUCOSE BLD-MCNC: 104 MG/DL (ref 70–108)
GLUCOSE BLD-MCNC: 170 MG/DL (ref 70–108)
IMMATURE RETIC FRACT: 9.6 % (ref 2.3–13.4)
IRON SATURATION: 7 % (ref 20–50)
IRON: 19 UG/DL (ref 65–195)
LACTIC ACID: 1.8 MMOL/L (ref 0.5–2)
OSMOLALITY URINE: 736 MOSMOL/KG (ref 250–750)
OSMOLALITY: 281 MOSMOL/KG (ref 275–295)
RETIC HEMOGLOBIN: 20.8 PG (ref 28.2–35.7)
RETICULOCYTE ABSOLUTE COUNT: 1.2 % (ref 0.5–2)
SARS-COV-2, NAAT: NOT  DETECTED
TOTAL IRON BINDING CAPACITY: 286 UG/DL (ref 171–450)
VITAMIN B-12: 424 PG/ML (ref 211–911)

## 2021-11-10 PROCEDURE — 83550 IRON BINDING TEST: CPT

## 2021-11-10 PROCEDURE — 71045 X-RAY EXAM CHEST 1 VIEW: CPT

## 2021-11-10 PROCEDURE — 83605 ASSAY OF LACTIC ACID: CPT

## 2021-11-10 PROCEDURE — 87635 SARS-COV-2 COVID-19 AMP PRB: CPT

## 2021-11-10 PROCEDURE — 93010 ELECTROCARDIOGRAM REPORT: CPT | Performed by: INTERNAL MEDICINE

## 2021-11-10 PROCEDURE — 1200000003 HC TELEMETRY R&B

## 2021-11-10 PROCEDURE — 83540 ASSAY OF IRON: CPT

## 2021-11-10 PROCEDURE — 82746 ASSAY OF FOLIC ACID SERUM: CPT

## 2021-11-10 PROCEDURE — 87804 INFLUENZA ASSAY W/OPTIC: CPT

## 2021-11-10 PROCEDURE — 82607 VITAMIN B-12: CPT

## 2021-11-10 PROCEDURE — 36415 COLL VENOUS BLD VENIPUNCTURE: CPT

## 2021-11-10 PROCEDURE — 6370000000 HC RX 637 (ALT 250 FOR IP): Performed by: STUDENT IN AN ORGANIZED HEALTH CARE EDUCATION/TRAINING PROGRAM

## 2021-11-10 PROCEDURE — 85046 RETICYTE/HGB CONCENTRATE: CPT

## 2021-11-10 PROCEDURE — 82948 REAGENT STRIP/BLOOD GLUCOSE: CPT

## 2021-11-10 PROCEDURE — 2580000003 HC RX 258: Performed by: HOSPITALIST

## 2021-11-10 PROCEDURE — 83930 ASSAY OF BLOOD OSMOLALITY: CPT

## 2021-11-10 PROCEDURE — 82728 ASSAY OF FERRITIN: CPT

## 2021-11-10 RX ORDER — CETIRIZINE HYDROCHLORIDE 10 MG/1
10 TABLET ORAL DAILY
Status: DISCONTINUED | OUTPATIENT
Start: 2021-11-10 | End: 2021-11-13 | Stop reason: HOSPADM

## 2021-11-10 RX ORDER — AZITHROMYCIN 250 MG/1
250 TABLET, FILM COATED ORAL DAILY
Status: DISCONTINUED | OUTPATIENT
Start: 2021-11-10 | End: 2021-11-12

## 2021-11-10 RX ORDER — ALBUTEROL SULFATE 2.5 MG/3ML
2.5 SOLUTION RESPIRATORY (INHALATION) EVERY 4 HOURS PRN
Status: DISCONTINUED | OUTPATIENT
Start: 2021-11-10 | End: 2021-11-13 | Stop reason: HOSPADM

## 2021-11-10 RX ORDER — HYDRALAZINE HYDROCHLORIDE 20 MG/ML
10 INJECTION INTRAMUSCULAR; INTRAVENOUS EVERY 6 HOURS PRN
Status: DISCONTINUED | OUTPATIENT
Start: 2021-11-10 | End: 2021-11-13 | Stop reason: HOSPADM

## 2021-11-10 RX ORDER — CEFDINIR 300 MG/1
300 CAPSULE ORAL 2 TIMES DAILY
Status: DISCONTINUED | OUTPATIENT
Start: 2021-11-10 | End: 2021-11-12

## 2021-11-10 RX ORDER — NICOTINE POLACRILEX 4 MG
15 LOZENGE BUCCAL PRN
Status: DISCONTINUED | OUTPATIENT
Start: 2021-11-10 | End: 2021-11-13 | Stop reason: HOSPADM

## 2021-11-10 RX ORDER — POLYETHYLENE GLYCOL 3350 17 G/17G
17 POWDER, FOR SOLUTION ORAL DAILY PRN
Status: DISCONTINUED | OUTPATIENT
Start: 2021-11-10 | End: 2021-11-13 | Stop reason: HOSPADM

## 2021-11-10 RX ORDER — SODIUM CHLORIDE 0.9 % (FLUSH) 0.9 %
5-40 SYRINGE (ML) INJECTION PRN
Status: DISCONTINUED | OUTPATIENT
Start: 2021-11-10 | End: 2021-11-13 | Stop reason: HOSPADM

## 2021-11-10 RX ORDER — SODIUM CHLORIDE 0.9 % (FLUSH) 0.9 %
5-40 SYRINGE (ML) INJECTION EVERY 12 HOURS SCHEDULED
Status: DISCONTINUED | OUTPATIENT
Start: 2021-11-10 | End: 2021-11-13 | Stop reason: HOSPADM

## 2021-11-10 RX ORDER — ONDANSETRON 4 MG/1
4 TABLET, ORALLY DISINTEGRATING ORAL EVERY 8 HOURS PRN
Status: DISCONTINUED | OUTPATIENT
Start: 2021-11-10 | End: 2021-11-13 | Stop reason: HOSPADM

## 2021-11-10 RX ORDER — AMLODIPINE BESYLATE 5 MG/1
5 TABLET ORAL DAILY
Status: DISCONTINUED | OUTPATIENT
Start: 2021-11-10 | End: 2021-11-13 | Stop reason: HOSPADM

## 2021-11-10 RX ORDER — DEXTROSE MONOHYDRATE 50 MG/ML
100 INJECTION, SOLUTION INTRAVENOUS PRN
Status: DISCONTINUED | OUTPATIENT
Start: 2021-11-10 | End: 2021-11-13 | Stop reason: HOSPADM

## 2021-11-10 RX ORDER — ACETAMINOPHEN 325 MG/1
650 TABLET ORAL EVERY 6 HOURS PRN
Status: DISCONTINUED | OUTPATIENT
Start: 2021-11-10 | End: 2021-11-13 | Stop reason: HOSPADM

## 2021-11-10 RX ORDER — PANTOPRAZOLE SODIUM 40 MG/1
40 TABLET, DELAYED RELEASE ORAL
Status: DISCONTINUED | OUTPATIENT
Start: 2021-11-11 | End: 2021-11-13 | Stop reason: HOSPADM

## 2021-11-10 RX ORDER — ONDANSETRON 2 MG/ML
4 INJECTION INTRAMUSCULAR; INTRAVENOUS EVERY 6 HOURS PRN
Status: DISCONTINUED | OUTPATIENT
Start: 2021-11-10 | End: 2021-11-13 | Stop reason: HOSPADM

## 2021-11-10 RX ORDER — DIVALPROEX SODIUM 500 MG/1
500 TABLET, DELAYED RELEASE ORAL 2 TIMES DAILY
Status: DISCONTINUED | OUTPATIENT
Start: 2021-11-10 | End: 2021-11-13 | Stop reason: HOSPADM

## 2021-11-10 RX ORDER — DEXTROSE MONOHYDRATE 25 G/50ML
12.5 INJECTION, SOLUTION INTRAVENOUS PRN
Status: DISCONTINUED | OUTPATIENT
Start: 2021-11-10 | End: 2021-11-13 | Stop reason: HOSPADM

## 2021-11-10 RX ORDER — ACETAMINOPHEN 650 MG/1
650 SUPPOSITORY RECTAL EVERY 6 HOURS PRN
Status: DISCONTINUED | OUTPATIENT
Start: 2021-11-10 | End: 2021-11-13 | Stop reason: HOSPADM

## 2021-11-10 RX ORDER — ATORVASTATIN CALCIUM 10 MG/1
10 TABLET, FILM COATED ORAL DAILY
Status: DISCONTINUED | OUTPATIENT
Start: 2021-11-10 | End: 2021-11-13 | Stop reason: HOSPADM

## 2021-11-10 RX ADMIN — ATORVASTATIN CALCIUM 10 MG: 10 TABLET, FILM COATED ORAL at 21:03

## 2021-11-10 RX ADMIN — CEFDINIR 300 MG: 300 CAPSULE ORAL at 21:03

## 2021-11-10 RX ADMIN — CETIRIZINE HYDROCHLORIDE 10 MG: 10 TABLET, FILM COATED ORAL at 21:03

## 2021-11-10 RX ADMIN — DIVALPROEX SODIUM 500 MG: 500 TABLET, DELAYED RELEASE ORAL at 21:03

## 2021-11-10 RX ADMIN — SODIUM CHLORIDE, PRESERVATIVE FREE 10 ML: 5 INJECTION INTRAVENOUS at 21:03

## 2021-11-10 RX ADMIN — AMLODIPINE BESYLATE 5 MG: 5 TABLET ORAL at 21:03

## 2021-11-10 RX ADMIN — AZITHROMYCIN MONOHYDRATE 250 MG: 250 TABLET ORAL at 21:03

## 2021-11-10 ASSESSMENT — PAIN SCALES - WONG BAKER: WONGBAKER_NUMERICALRESPONSE: 0

## 2021-11-10 ASSESSMENT — PAIN SCALES - GENERAL: PAINLEVEL_OUTOF10: 0

## 2021-11-10 NOTE — ED PROVIDER NOTES
 aspirin 81 MG tablet Take 81 mg by mouth daily. ALLERGIES    No Known Allergies    FAMILY HISTORY    History reviewed. No pertinent family history. SOCIAL HISTORY    Social History     Socioeconomic History    Marital status:      Spouse name: None    Number of children: None    Years of education: None    Highest education level: None   Occupational History    None   Tobacco Use    Smoking status: Current Every Day Smoker     Packs/day: 0.50     Years: 55.00     Pack years: 27.50    Smokeless tobacco: Former User     Quit date: 1/1/1984   Substance and Sexual Activity    Alcohol use: No     Alcohol/week: 0.0 standard drinks    Drug use: No    Sexual activity: None   Other Topics Concern    None   Social History Narrative    None     Social Determinants of Health     Financial Resource Strain:     Difficulty of Paying Living Expenses: Not on file   Food Insecurity:     Worried About Running Out of Food in the Last Year: Not on file    Jeremiah of Food in the Last Year: Not on file   Transportation Needs:     Lack of Transportation (Medical): Not on file    Lack of Transportation (Non-Medical):  Not on file   Physical Activity:     Days of Exercise per Week: Not on file    Minutes of Exercise per Session: Not on file   Stress:     Feeling of Stress : Not on file   Social Connections:     Frequency of Communication with Friends and Family: Not on file    Frequency of Social Gatherings with Friends and Family: Not on file    Attends Judaism Services: Not on file    Active Member of Clubs or Organizations: Not on file    Attends Club or Organization Meetings: Not on file    Marital Status: Not on file   Intimate Partner Violence:     Fear of Current or Ex-Partner: Not on file    Emotionally Abused: Not on file    Physically Abused: Not on file    Sexually Abused: Not on file   Housing Stability:     Unable to Pay for Housing in the Last Year: Not on file    Number of Psychiatric:  Affect normal, Judgment normal, Mood normal.     EKG    Sinus rhythm with premature ventricular complexes    RADIOLOGY    CT Head WO Contrast   Final Result   1. No acute intracranial pathology   2. Comminuted fracture right nasal bone. **This report has been created using voice recognition software. It may contain minor errors which are inherent in voice recognition technology. **      Final report electronically signed by Dr. Jg Gutiérrez on 11/9/2021 8:14 PM      CT Cervical Spine WO Contrast   Final Result   No acute process            **This report has been created using voice recognition software. It may contain minor errors which are inherent in voice recognition technology. **      Final report electronically signed by Dr. Jg Gutiérrez on 11/9/2021 8:45 PM      XR PELVIS (1-2 VIEWS)   Final Result   Negative exam            **This report has been created using voice recognition software. It may contain minor errors which are inherent in voice recognition technology. **      Final report electronically signed by Dr. Jg Gutiérrez on 11/9/2021 8:11 PM          LABS  Labs Reviewed   CBC WITH AUTO DIFFERENTIAL - Abnormal; Notable for the following components:       Result Value    RBC 4.00 (*)     Hemoglobin 10.2 (*)     Hematocrit 33.9 (*)     MCH 25.5 (*)     MCHC 30.1 (*)     RDW-CV 15.7 (*)     RDW-SD 47.8 (*)     Segs Absolute 8.1 (*)     Lymphocytes Absolute 0.7 (*)     Immature Grans (Abs) 0.16 (*)     All other components within normal limits   COMPREHENSIVE METABOLIC PANEL W/ REFLEX TO MG FOR LOW K - Abnormal; Notable for the following components:    Glucose 124 (*)     Sodium 131 (*)     Chloride 97 (*)     CO2 19 (*)     AST 44 (*)     Albumin 3.2 (*)      (*)     All other components within normal limits   OSMOLALITY - Abnormal; Notable for the following components:    Osmolality Calc 265.6 (*)     All other components within normal limits   ANION GAP   GLOMERULAR

## 2021-11-10 NOTE — ED NOTES
ED nurse-to-nurse bedside report    Chief Complaint   Patient presents with   [facility] Extremity Weakness      LOC: oriented to name only  Vital signs   Vitals:    11/09/21 1900   BP: 139/69   Pulse: 94   Resp: 18   SpO2: 96%   Weight: 191 lb (86.6 kg)   Height: 5' 11\" (1.803 m)      Pain:    Pain Interventions: NA  Pain Goal: 0  Oxygen: No    Current needs required RA   Telemetry: Yes  LDAs:   Peripheral IV 11/07/21 Left Antecubital (Active)       Peripheral IV 11/07/21 Left Wrist (Active)     Continuous Infusions:   Mobility: Requires assistance * 1  Shelley Fall Risk Score:    Fall Risk 12/1/2020 12/16/2019 10/30/2019 6/25/2018 6/5/2017 11/23/2015   2 or more falls in past year? no no yes no no no   Fall with injury in past year? no no yes no no no     Fall Interventions: side rails up x2, call light in reach, family at bedside  Report given to: Shay Dockery RN  11/09/21 4782

## 2021-11-10 NOTE — ED NOTES
ED to inpatient nurses report    Chief Complaint   Patient presents with    Fall    Extremity Weakness      Present to ED from home for weakness resulting in a fall. Pt uses walker/cane per family. Pt was d/c'd from inpatient 3 hours prior to ER visit. Pt dx with pnuemonia. Wife appeared very overwhelmed and verbalized not being able to care for patient at home and then he fell. labwork unremarkable and CT (-). Pt a+Ox4 but appears to have impaired judgement, trying to get out of bed. Pt incontinent/breif, verbalizes needing to void with urinal but has needed cleaned up for incontinence, primofit in use.    LOC: alert and orientated to name, place, date  Vital signs   Vitals:    11/09/21 2026 11/09/21 2102 11/09/21 2155 11/09/21 2315   BP: (!) 145/86 (!) 147/81 (!) 152/77 131/77   Pulse: 88 88 88 89   Resp: 15 15 16 15   SpO2: 96% 98% 97% 98%   Weight:       Height:          Oxygen Baseline RA    Current needs required RA Bipap/Cpap No  LDAs:   Peripheral IV 11/07/21 Left Antecubital (Active)       Peripheral IV 11/07/21 Left Wrist (Active)     Mobility: Fully dependent  Pending ED orders: n/a  Present condition: stable    Electronically signed by Cira Vinson RN on 11/9/2021 at 11:36 PM     Cira Vinson RN  11/09/21 6114

## 2021-11-10 NOTE — ED NOTES
Pt transported to [facility] at this time. Floor notified.       [facility]  11/10/21 5117

## 2021-11-10 NOTE — ED TRIAGE NOTES
Pt to ED from home due to a fall. Wife states that he was discharged from the hospital today for pneumonia. Once they got home, the pt was using the restroom and once he stood up he lost his balance and fell. Wife states that the pt hit his head however did not lose consciousness. Pt denies pain at this time. No distress noted. Respirations seven and unlabored. There appears to be an abrasion to the left forehead. EKG done. VSS.

## 2021-11-10 NOTE — H&P
History & Physical        Patient:  Robinson Weaver  YOB: 1945    MRN: [id number]     Acct: [de-identified]    PCP: Shai Edward MD    Date of Admission: 11/9/2021    Date of Service: Pt seen/examined on 11/9/2021  and Admitted to inpatient with expected LOS greater than two midnights due to medical therapy. Chief Complaint: Weakness, adult failure to thrive. History Of Present Illness:  Robisnon Weaver is a 67 y.o. male with PMHx of CAD, hypertension, hyperlipidemia, GERD, bipolar disorder, recently admitted here from [facility] after being found confused at home. Patient was found to have acute respiratory failure secondary to bilateral pneumonia. Patient was found to be negative for Covid prior to transfer from [facility]. In the course of his hospitalization, patient received antibiotic management and demonstrated improvement. Patient was documented to be A+O x3 on 11/9/2021, and family members noted that patient's mental status was at baseline. Patient was therefore discharged home on 11/9/2021. However, it appears that patient sustained a fall upon return home. Patient reportedly hit his head although there was no loss of consciousness. Patient therefore was brought back to the ED. According to the ED notes, patient's wife realized that patient is too weak to ambulate and that she is unable to safely care for him at home. In the ED, CT of the head without contrast demonstrated no acute intracranial pathology. It did note comminuted fracture of the right nasal bone. CT C-spine did not demonstrate any acute findings. Urinary analysis negative for any acute infection. Chest x-ray is negative for acute findings. We were asked to admit this patient for further management.         Past Medical History:          Diagnosis Date    ASCVD (arteriosclerotic cardiovascular disease)     Bipolar 1 disorder (HCC)     GERD (gastroesophageal reflux disease)     Hyperlipidemia     Hypertension     Osteoarthritis     Type II or unspecified type diabetes mellitus without mention of complication, not stated as uncontrolled        Past Surgical History:          Procedure Laterality Date    FOOT SURGERY Left     STOMACH SURGERY         Medications Prior to Admission:      Prior to Admission medications    Medication Sig Start Date End Date Taking? Authorizing Provider   cefdinir (OMNICEF) 300 MG capsule Take 1 capsule by mouth 2 times daily for 8 days 11/9/21 11/17/21  Klarissa Fatima MD   azithromycin [facility]) 250 MG tablet Take 1 tablet by mouth daily for 2 doses 11/10/21 11/12/21  Klarissa Fatima MD   lovastatin (MEVACOR) 20 MG tablet Take 1 tablet by mouth daily 6/1/21   Negro Cadet MD   triamterene-hydroCHLOROthiazide [facility]) 37.5-25 MG per tablet Take 1 tablet by mouth daily 6/1/21   Negro Cadet MD   omeprazole (PRILOSEC) 20 MG delayed release capsule Take 1 capsule by mouth Daily 6/1/21   Negro Cadet MD   divalproex (DEPAKOTE) 250 MG DR tablet Take 2 tablets by mouth 2 times daily 6/1/21   Negro Cadet MD   metFORMIN (GLUCOPHAGE) 1000 MG tablet Take 1 tablet by mouth 2 times daily (with meals) 6/1/21   Negro Cadet MD   blood glucose test strips (ADVOCATE REDI-CODE) strip Uses once daily and AS NEEDED. Non-insulin use. DX: E11.9. 7/1/19   Negro Cadet MD   cetirizine (ZYRTEC) 10 MG tablet Take 10 mg by mouth daily    Historical Provider, MD   aspirin 81 MG tablet Take 81 mg by mouth daily. Historical Provider, MD       Allergies:  Patient has no known allergies. Social History:      The patient currently lives at home. TOBACCO:   reports that he has been smoking. He has a 27.50 pack-year smoking history. He quit smokeless tobacco use about 37 years ago. ETOH:   reports no history of alcohol use. DRUG USE HISTORY: Denies. EMPLOYMENT HISTORY: Retired.     Family History:      Both parents . Patient could not tell me from what. REVIEW OF SYSTEMS:   Patient unable to provide this information currently. PHYSICAL EXAM:    BP (!) 154/90   Pulse 85   Resp 16   Ht 5' 11\" (1.803 m)   Wt 191 lb (86.6 kg)   SpO2 95%   BMI 26.64 kg/m²     General appearance: Patient is alert. However, he could not tell me what year it is or where he is at. When asked if he knew why he is here, patient did confirm that he was here because he fell. However, he remains a very poor historian. Eyes:  Pupils equal, round, and reactive to light. Conjunctivae/corneas clear. HENT: Slight superficial ecchymosis over the superior aspect of his left eyebrow. Head normal in appearance. External nares normal.  Oral mucosa moist without lesions. Hearing grossly intact. Neck: Supple, with full range of motion. Trachea midline. No gross JVD appreciated. Respiratory:  Normal respiratory effort. Clear to auscultation, bilaterally without rales or wheezes or rhonchi. Cardiovascular: Normal rate, regular rhythm with normal S1/S2 without murmurs. No lower extremity edema. Abdomen: Soft, non-tender, non-distended with normal bowel sounds. Musculoskeletal: There is no joint swelling or tenderness. Normal tone. No abnormal movements. Skin: Warm and dry. No rashes or lesions. Neurologic:  No focal sensory/motor deficits in the upper and lower extremities. Cranial nerves:  grossly non-focal 2-12. Psychiatric: Cannot assess at this time. Capillary Refill: Brisk,< 3 seconds. Peripheral Pulses: +2 palpable, equal bilaterally.       Labs:     Recent Labs     21  1351 21  0536 21   WBC 19.4* 15.3* 9.8   HGB 10.7* 9.4* 10.2*   HCT 34.9* 30.2* 33.9*    171 151     Recent Labs     21  1351 21  0536 21    136 131*   K 4.2 4.3 3.6   CL 98 101 97*   CO2 18* 24 19*   BUN 15 17 17   CREATININE 1.2 1.0 0.8   CALCIUM 8.2* 7.9* 8.9     Recent Labs 11/07/21  1351 11/09/21 2013   * 44*   * 118*   BILITOT 0.8 0.4   ALKPHOS 77 91     No results for input(s): INR in the last 72 hours. No results for input(s): Nathan Schwarz in the last 72 hours. Urinalysis:      Lab Results   Component Value Date    NITRU NEGATIVE 11/09/2021    WBCUA 0-2 11/09/2021    BACTERIA NONE SEEN 11/09/2021    RBCUA 0-2 11/09/2021    BLOODU TRACE 11/09/2021    SPECGRAV 1.020 11/07/2016    GLUCOSEU 100 11/09/2021       Intake & Output:  No intake/output data recorded. No intake/output data recorded. CT Head WO Contrast   Final Result   1. No acute intracranial pathology   2. Comminuted fracture right nasal bone. **This report has been created using voice recognition software. It may contain minor errors which are inherent in voice recognition technology. **      Final report electronically signed by Dr. Tracey Srinivasan on 11/9/2021 8:14 PM      CT Cervical Spine WO Contrast   Final Result   No acute process            **This report has been created using voice recognition software. It may contain minor errors which are inherent in voice recognition technology. **      Final report electronically signed by Dr. Tracey Srinivasan on 11/9/2021 8:45 PM      XR PELVIS (1-2 VIEWS)   Final Result   Negative exam            **This report has been created using voice recognition software. It may contain minor errors which are inherent in voice recognition technology. **      Final report electronically signed by Dr. Tracey Srinivasan on 11/9/2021 8:11 PM               Assessment And Plan:    1. Status post fall: It appears that patient is again found to be unstable at home. CT of the head did not demonstrate acute intracranial findings, but did demonstrate comminuted fracture of right nasal bone. No specific intervention at this time, we will continue conservative management and supportive care. Patient will remain on fall precautions    2.   Confusion: Patient was noted to be a and O x3 at discharge per progress note. At this time, patient again appears confused. No clear infectious etiology. As noted above, patient tested negative for Covid prior to his transfer from [facility]. We will recheck Covid. We will continue neuro checks every 4 hours. If patient continues to demonstrate mental decline, we may need to repeat CT imaging of the head for interval reevaluation of potential delayed bleeding. 3.  Deconditioning: We will have physical therapy and occupational evaluate and treat. Patient may need placement. We will discuss further with  and case management. 4.  Recent pneumonia: Per chest x-ray on this encounter, no abnormality seen. 5.  Coronary artery disease: We will continue with aspirin and lovastatin    6. Hypertension: Stable. We will hold patient's hydrochlorothiazide for now given presentation of mild hyponatremia. We will treat with hydralazine as needed for systolic blood pressure above 160.    7. Hyperlipidemia: We will continue with patient's home medication of lovastatin. 8.  Hyponatremia: Mild hyponatremia. We will hold patient's hydrochlorothiazide for now. Pending serum and urine osmolality studies. We will monitor patient's sodium levels. 9.  Diabetes mellitus type 2: We will hold patient's Metformin. We will cover with insulin sliding scale for now. 10.  DVT prophylaxis: SCDs, Lovenox daily injection. Code Status: Full Code    Thank you Jenni Jones MD for the opportunity to be involved in this patient's care.     Electronically signed by Poppy Roberts MD on 11/10/2021 at 7:27 AM

## 2021-11-10 NOTE — PROGRESS NOTES
Pt admitted to  6K5 via cart/stretcher. Complaints: confusion. IV none infusing into the wrist right, condition patent and no redness. IV site free of s/s of infection or infiltration. Vital signs obtained. Assessment and data collection initiated. Two nurse skin assessment performed by Adrianne Bosworth RN and Rolando Schmitz RN. Oriented to room. Policies and procedures for 6K explained. Adrianne Bosworth RN discussed hourly rounding with patient addressing 5 P's. Fall prevention and safety brochure discussed with patient. Bed alarm on. Call light in reach.

## 2021-11-10 NOTE — ED NOTES
Given food tray at this time.  Alert family to room assignment on 8B     [facility] [address]  11/10/21 8055

## 2021-11-10 NOTE — PROGRESS NOTES
PROGRESS NOTE      Patient:  Batsheva Staton      Unit/Bed:6K-05/005-A    YOB: 1945    MRN: [id number]       Acct: [de-identified]     PCP: Chantal Parks MD    Date of Admission: 11/9/2021      Assessment/Plan:    Active Hospital Problems    Diagnosis Date Noted    Altered mental status [R41.82] 11/10/2021       Mechanical fall  Generalized weakness/physical deconditioning  Comminuted fracture of right nasal bone  -Pt apparently fell at home in the bathroom, remembers the incident, did hit his head  -CT head and c-spine consistent for right nasal bone fracture only  -Pt fell while on ASA, on hold right now considering fall in the setting of worsened encephalopathy. Will hold ASA and chemical DVT prophylaxis right now pending improvement  -No interventions for the fracture at this time  -PT/OT consulted as well as  for assessment and planning of discharge to SNF vs IP rehab    Acute encephalopathy  -Likely metabolic considering recent lab findings however differential includes post-ictal confusion. Lower clinic suspicion for stroke as he does not have any motor or sensory deficits or asymmetry on neuro exam. Of note, he did have similar confusion when admitted recently for sepis/PNA/hypoxic resp failure which improved as infection was treated. At this time, no clinical signs of infection, lungs are CTAB, CXR no findings, no findings that explain encephalopathy on CT head/neck.   -Of note, recent TSH normal, a1c of 7.3%.  -Will order EEG to further investigate. If no significant findings, consider MRI head. Anion gap metabolic acidosis  -Was present at recent admission due to lactic acidosis. Will recheck a lactic today.     Mild hyponatremia  -Hold home maxzide at this time    Transaminitis  -No c/o abd pain, no abd pain on palpation; AST ALT improving as compared to 2 days ago; will recheck CMP tomorrow    Recent sepsis d/t pneumonia  -Continue and complete azithromycin and right nasal bone. CT C-spine did not demonstrate any acute findings. Urinary analysis negative for any acute infection. Chest x-ray is negative for acute findings. \"    11/10: Mild hyponatremia is present. Pt is awake and alert; is only oriented to self. Subjective: Pt seen and examined while he is laying in bed. Daughter at bedside, states pt seems more confused than his baseline. Pt states he was standing in the bathroom and turned around too quickly and fell. Although he states he did not hit his head, daughter states he did. Pt's wife is not at bedside at this time. Daughter states pt's wife feels she can't take care of pt at home and family wants SNF placement for rehab. Pt denies any CP, SOB, abdominal pain, HA, leg pain swelling cramping. Medications:  Reviewed    Infusion Medications    dextrose       Scheduled Medications    sodium chloride flush  5-40 mL IntraVENous 2 times per day    [Held by provider] enoxaparin  40 mg SubCUTAneous Daily    insulin lispro  0-6 Units SubCUTAneous TID WC    insulin lispro  0-3 Units SubCUTAneous Nightly    azithromycin  250 mg Oral Daily    cefdinir  300 mg Oral BID    cetirizine  10 mg Oral Daily    divalproex  500 mg Oral BID    atorvastatin  10 mg Oral Daily    [START ON 11/11/2021] pantoprazole  40 mg Oral QAM AC    amLODIPine  5 mg Oral Daily     PRN Meds: sodium chloride flush, ondansetron **OR** ondansetron, polyethylene glycol, acetaminophen **OR** acetaminophen, hydrALAZINE, glucose, dextrose, glucagon (rDNA), dextrose, albuterol    No intake or output data in the 24 hours ending 11/10/21 1750    Diet:  ADULT DIET; Regular; 4 carb choices (60 gm/meal)    Exam:  BP (!) 145/86   Pulse 94   Temp 98.6 °F (37 °C) (Oral)   Resp 18   Ht 5' 11\" (1.803 m)   Wt 191 lb (86.6 kg)   SpO2 97%   BMI 26.64 kg/m²     General appearance: No apparent distress, appears stated age and cooperative. HEENT: Pupils equal, round, and reactive to light. Conjunctivae/corneas clear. Neck: Supple, with full range of motion. No jugular venous distention. Trachea midline. Respiratory:  Normal respiratory effort. Clear to auscultation, bilaterally without Rales/Wheezes/Rhonchi. Cardiovascular: Regular rate and rhythm with normal S1/S2 without murmurs, rubs or gallops. Abdomen: Soft, non-tender, non-distended with normal bowel sounds. Musculoskeletal: passive and active ROM x 4 extremities. Skin: Skin color, texture, turgor normal.  No rashes or lesions. Neurologic:  Neurovascularly intact without any focal sensory/motor deficits. Cranial nerves: II-XII intact, grossly non-focal.  Psychiatric: Alert and oriented to self only  Capillary Refill: Brisk,< 3 seconds   Peripheral Pulses: +2 palpable, equal bilaterally       Labs:   Recent Labs     11/08/21 0536 11/09/21 2013   WBC 15.3* 9.8   HGB 9.4* 10.2*   HCT 30.2* 33.9*    151     Recent Labs     11/08/21 0536 11/09/21 2013    131*   K 4.3 3.6    97*   CO2 24 19*   BUN 17 17   CREATININE 1.0 0.8   CALCIUM 7.9* 8.9     Recent Labs     11/09/21 2013   AST 44*   *   BILITOT 0.4   ALKPHOS 91     No results for input(s): INR in the last 72 hours. No results for input(s): Assunta Moore in the last 72 hours. Urinalysis:      Lab Results   Component Value Date    NITRU NEGATIVE 11/09/2021    WBCUA 0-2 11/09/2021    BACTERIA NONE SEEN 11/09/2021    RBCUA 0-2 11/09/2021    BLOODU TRACE 11/09/2021    SPECGRAV 1.020 11/07/2016    GLUCOSEU 100 11/09/2021       Radiology:  XR CHEST PORTABLE   Final Result   Stable radiographic appearance of the chest. No evidence of an acute process. **This report has been created using voice recognition software. It may contain minor errors which are inherent in voice recognition technology. **      Final report electronically signed by Dr. Duncan Coker on 11/10/2021 3:25 PM      CT Head WO Contrast   Final Result   1.  No acute intracranial pathology   2. Comminuted fracture right nasal bone. **This report has been created using voice recognition software. It may contain minor errors which are inherent in voice recognition technology. **      Final report electronically signed by Dr. Kishan Meyer on 11/9/2021 8:14 PM      CT Cervical Spine WO Contrast   Final Result   No acute process            **This report has been created using voice recognition software. It may contain minor errors which are inherent in voice recognition technology. **      Final report electronically signed by Dr. Kishan Meyer on 11/9/2021 8:45 PM      XR PELVIS (1-2 VIEWS)   Final Result   Negative exam            **This report has been created using voice recognition software. It may contain minor errors which are inherent in voice recognition technology. **      Final report electronically signed by Dr. Kishan Meyer on 11/9/2021 8:11 PM          Diet: ADULT DIET;  Regular; 4 carb choices (60 gm/meal)    PT/OT Eval Status: consulted    DVT prophylaxis: [] Lovenox                                 [x] SCDs                                 [] SQ Heparin                                 [x] Encourage ambulation           [] Already on Anticoagulation     Disposition:    [] Home       [] TCU       [x] Rehab       [] Psych       [x] SNF       [] [place]       [] Other-    Case discussed with Dr. Kamilla Olivares    Electronically signed by Bret Saeed MD on 11/10/2021 at 5:50 PM

## 2021-11-11 ENCOUNTER — APPOINTMENT (OUTPATIENT)
Dept: MRI IMAGING | Age: 76
DRG: 071 | End: 2021-11-11
Payer: MEDICARE

## 2021-11-11 LAB
ALBUMIN SERPL-MCNC: 3.1 G/DL (ref 3.5–5.1)
ALP BLD-CCNC: 85 U/L (ref 38–126)
ALT SERPL-CCNC: 81 U/L (ref 11–66)
AMMONIA: 35 UMOL/L (ref 11–60)
ANION GAP SERPL CALCULATED.3IONS-SCNC: 14 MEQ/L (ref 8–16)
AST SERPL-CCNC: 39 U/L (ref 5–40)
BASE EXCESS MIXED: 0.1 MMOL/L (ref -2–3)
BILIRUB SERPL-MCNC: 0.4 MG/DL (ref 0.3–1.2)
BILIRUBIN DIRECT: < 0.2 MG/DL (ref 0–0.3)
BUN BLDV-MCNC: 16 MG/DL (ref 7–22)
CALCIUM SERPL-MCNC: 8.7 MG/DL (ref 8.5–10.5)
CHLORIDE BLD-SCNC: 97 MEQ/L (ref 98–111)
CO2: 22 MEQ/L (ref 23–33)
COLLECTED BY:: ABNORMAL
CREAT SERPL-MCNC: 0.8 MG/DL (ref 0.4–1.2)
ERYTHROCYTE [DISTWIDTH] IN BLOOD BY AUTOMATED COUNT: 15.3 % (ref 11.5–14.5)
ERYTHROCYTE [DISTWIDTH] IN BLOOD BY AUTOMATED COUNT: 44.1 FL (ref 35–45)
GFR SERPL CREATININE-BSD FRML MDRD: > 90 ML/MIN/1.73M2
GLUCOSE BLD-MCNC: 109 MG/DL (ref 70–108)
GLUCOSE BLD-MCNC: 129 MG/DL (ref 70–108)
GLUCOSE BLD-MCNC: 132 MG/DL (ref 70–108)
GLUCOSE BLD-MCNC: 147 MG/DL (ref 70–108)
HCO3, MIXED: 22 MMOL/L (ref 23–28)
HCT VFR BLD CALC: 33.1 % (ref 42–52)
HEMOGLOBIN: 10.1 GM/DL (ref 14–18)
MAGNESIUM: 1.9 MG/DL (ref 1.6–2.4)
MCH RBC QN AUTO: 24.4 PG (ref 26–33)
MCHC RBC AUTO-ENTMCNC: 30.5 GM/DL (ref 32.2–35.5)
MCV RBC AUTO: 80 FL (ref 80–94)
O2 SAT, MIXED: 88 %
PCO2, MIXED VENOUS: 28 MMHG (ref 41–51)
PH, MIXED: 7.51 (ref 7.31–7.41)
PLATELET # BLD: 160 THOU/MM3 (ref 130–400)
PMV BLD AUTO: 10.2 FL (ref 9.4–12.4)
PO2 MIXED: 48 MMHG (ref 25–40)
POTASSIUM REFLEX MAGNESIUM: 3.3 MEQ/L (ref 3.5–5.2)
RBC # BLD: 4.14 MILL/MM3 (ref 4.7–6.1)
SODIUM BLD-SCNC: 133 MEQ/L (ref 135–145)
T4 FREE: 0.88 NG/DL (ref 0.93–1.76)
TOTAL PROTEIN: 6.4 G/DL (ref 6.1–8)
TSH SERPL DL<=0.05 MIU/L-ACNC: 5.82 UIU/ML (ref 0.4–4.2)
VALPROIC ACID LEVEL: 83.5 UG/ML (ref 50–100)
WBC # BLD: 7.6 THOU/MM3 (ref 4.8–10.8)

## 2021-11-11 PROCEDURE — 97116 GAIT TRAINING THERAPY: CPT

## 2021-11-11 PROCEDURE — 83735 ASSAY OF MAGNESIUM: CPT

## 2021-11-11 PROCEDURE — 85027 COMPLETE CBC AUTOMATED: CPT

## 2021-11-11 PROCEDURE — 80076 HEPATIC FUNCTION PANEL: CPT

## 2021-11-11 PROCEDURE — 80048 BASIC METABOLIC PNL TOTAL CA: CPT

## 2021-11-11 PROCEDURE — 84425 ASSAY OF VITAMIN B-1: CPT

## 2021-11-11 PROCEDURE — 87040 BLOOD CULTURE FOR BACTERIA: CPT

## 2021-11-11 PROCEDURE — 82948 REAGENT STRIP/BLOOD GLUCOSE: CPT

## 2021-11-11 PROCEDURE — 70553 MRI BRAIN STEM W/O & W/DYE: CPT

## 2021-11-11 PROCEDURE — 99222 1ST HOSP IP/OBS MODERATE 55: CPT | Performed by: PHYSICAL MEDICINE & REHABILITATION

## 2021-11-11 PROCEDURE — 1200000003 HC TELEMETRY R&B

## 2021-11-11 PROCEDURE — 82140 ASSAY OF AMMONIA: CPT

## 2021-11-11 PROCEDURE — 97163 PT EVAL HIGH COMPLEX 45 MIN: CPT

## 2021-11-11 PROCEDURE — 2580000003 HC RX 258: Performed by: HOSPITALIST

## 2021-11-11 PROCEDURE — 99233 SBSQ HOSP IP/OBS HIGH 50: CPT | Performed by: FAMILY MEDICINE

## 2021-11-11 PROCEDURE — 97535 SELF CARE MNGMENT TRAINING: CPT

## 2021-11-11 PROCEDURE — 97166 OT EVAL MOD COMPLEX 45 MIN: CPT

## 2021-11-11 PROCEDURE — 36415 COLL VENOUS BLD VENIPUNCTURE: CPT

## 2021-11-11 PROCEDURE — 82803 BLOOD GASES ANY COMBINATION: CPT

## 2021-11-11 PROCEDURE — 95819 EEG AWAKE AND ASLEEP: CPT | Performed by: PSYCHIATRY & NEUROLOGY

## 2021-11-11 PROCEDURE — 6360000004 HC RX CONTRAST MEDICATION: Performed by: SOCIAL WORKER

## 2021-11-11 PROCEDURE — 84439 ASSAY OF FREE THYROXINE: CPT

## 2021-11-11 PROCEDURE — 95819 EEG AWAKE AND ASLEEP: CPT

## 2021-11-11 PROCEDURE — 84443 ASSAY THYROID STIM HORMONE: CPT

## 2021-11-11 PROCEDURE — 99223 1ST HOSP IP/OBS HIGH 75: CPT | Performed by: SOCIAL WORKER

## 2021-11-11 PROCEDURE — 80164 ASSAY DIPROPYLACETIC ACD TOT: CPT

## 2021-11-11 PROCEDURE — A9579 GAD-BASE MR CONTRAST NOS,1ML: HCPCS | Performed by: SOCIAL WORKER

## 2021-11-11 PROCEDURE — 6370000000 HC RX 637 (ALT 250 FOR IP): Performed by: STUDENT IN AN ORGANIZED HEALTH CARE EDUCATION/TRAINING PROGRAM

## 2021-11-11 RX ORDER — POTASSIUM CHLORIDE 20 MEQ/1
40 TABLET, EXTENDED RELEASE ORAL PRN
Status: DISCONTINUED | OUTPATIENT
Start: 2021-11-11 | End: 2021-11-13 | Stop reason: HOSPADM

## 2021-11-11 RX ORDER — POTASSIUM CHLORIDE 7.45 MG/ML
10 INJECTION INTRAVENOUS PRN
Status: DISCONTINUED | OUTPATIENT
Start: 2021-11-11 | End: 2021-11-13 | Stop reason: HOSPADM

## 2021-11-11 RX ORDER — ASCORBIC ACID 500 MG
500 TABLET ORAL DAILY
Status: DISCONTINUED | OUTPATIENT
Start: 2021-11-11 | End: 2021-11-13 | Stop reason: HOSPADM

## 2021-11-11 RX ORDER — FERROUS SULFATE 325(65) MG
325 TABLET ORAL EVERY OTHER DAY
Status: DISCONTINUED | OUTPATIENT
Start: 2021-11-11 | End: 2021-11-13 | Stop reason: HOSPADM

## 2021-11-11 RX ADMIN — OXYCODONE HYDROCHLORIDE AND ACETAMINOPHEN 500 MG: 500 TABLET ORAL at 12:02

## 2021-11-11 RX ADMIN — GADOTERIDOL 15 ML: 279.3 INJECTION, SOLUTION INTRAVENOUS at 16:05

## 2021-11-11 RX ADMIN — POTASSIUM CHLORIDE 40 MEQ: 1500 TABLET, EXTENDED RELEASE ORAL at 08:53

## 2021-11-11 RX ADMIN — CEFDINIR 300 MG: 300 CAPSULE ORAL at 08:53

## 2021-11-11 RX ADMIN — SODIUM CHLORIDE, PRESERVATIVE FREE 10 ML: 5 INJECTION INTRAVENOUS at 20:52

## 2021-11-11 RX ADMIN — AZITHROMYCIN MONOHYDRATE 250 MG: 250 TABLET ORAL at 08:53

## 2021-11-11 RX ADMIN — DIVALPROEX SODIUM 500 MG: 500 TABLET, DELAYED RELEASE ORAL at 08:53

## 2021-11-11 RX ADMIN — PANTOPRAZOLE SODIUM 40 MG: 40 TABLET, DELAYED RELEASE ORAL at 05:30

## 2021-11-11 RX ADMIN — SODIUM CHLORIDE, PRESERVATIVE FREE 10 ML: 5 INJECTION INTRAVENOUS at 08:53

## 2021-11-11 RX ADMIN — DIVALPROEX SODIUM 500 MG: 500 TABLET, DELAYED RELEASE ORAL at 17:24

## 2021-11-11 RX ADMIN — CETIRIZINE HYDROCHLORIDE 10 MG: 10 TABLET, FILM COATED ORAL at 08:53

## 2021-11-11 RX ADMIN — FERROUS SULFATE TAB 325 MG (65 MG ELEMENTAL FE) 325 MG: 325 (65 FE) TAB at 12:02

## 2021-11-11 RX ADMIN — ATORVASTATIN CALCIUM 10 MG: 10 TABLET, FILM COATED ORAL at 08:53

## 2021-11-11 RX ADMIN — CEFDINIR 300 MG: 300 CAPSULE ORAL at 20:52

## 2021-11-11 RX ADMIN — AMLODIPINE BESYLATE 5 MG: 5 TABLET ORAL at 08:53

## 2021-11-11 ASSESSMENT — ENCOUNTER SYMPTOMS
PHOTOPHOBIA: 0
DIARRHEA: 0
TROUBLE SWALLOWING: 0
CONSTIPATION: 0
RHINORRHEA: 0
NAUSEA: 0
COLOR CHANGE: 0
SHORTNESS OF BREATH: 0
WHEEZING: 0
VOMITING: 0
COUGH: 0
EYE DISCHARGE: 0

## 2021-11-11 ASSESSMENT — PAIN SCALES - WONG BAKER: WONGBAKER_NUMERICALRESPONSE: 0

## 2021-11-11 ASSESSMENT — PAIN SCALES - GENERAL: PAINLEVEL_OUTOF10: 0

## 2021-11-11 NOTE — PROGRESS NOTES
Dr. Cecelia Aranda notified received a call from [facility] that patient had positive blood cultures on 11/7 showing E.coli and Enteroccus gallinarum. No new orders received.

## 2021-11-11 NOTE — CONSULTS
Neurology Consult Note    Date:2021       [id number]  Patient Sundeep Roman     YOB: 1945     Age:76 y.o. Requesting Physician: Kami Lopes MD     Reason for Consult:  Evaluate for Seizure, Encephalopathy      Chief Complaint: Altered Mental Status    Subjective     Fidencio Callaway is a 68 yo M who presented to [facility] ED after a mechanical fall in the bathroom at home at 4pm on 21. Pt had been discharged from [facility] earlier that day from admission for B PNA and encephalopathy. Per pts wife, pt had improved mentation but not back to baseline at discharge. She reports intermittent AMS. On this admission pts wife reports that pt had behavior change for weeks before original admission/PNA diagnosis consisting of fidgeting, jumping up and down and staring into space. These are characterized as wringing hand movements and rubbing legs while seated, moving from seated to standing position frequently and without purpose and while seated and not stimulated he would stare into space or at the clock. These staring episodes did no occur while in conversation and he would remain responsive while staring. Review of Systems   Review of Systems   Constitutional: Negative for activity change, fatigue and fever. HENT: Negative for tinnitus and trouble swallowing. Eyes: Negative for photophobia and visual disturbance. Respiratory: Negative for cough and shortness of breath. Cardiovascular: Negative for chest pain and palpitations. Gastrointestinal: Negative for diarrhea, nausea and vomiting. Genitourinary: Negative for dysuria and flank pain. Musculoskeletal: Positive for gait problem (falls). Negative for neck pain and neck stiffness. Skin: Negative for color change and rash. Neurological: Negative for dizziness, facial asymmetry, speech difficulty, weakness, numbness and headaches. Psychiatric/Behavioral: Negative for agitation and behavioral problems. Medications   Scheduled Meds:    ferrous sulfate  325 mg Oral Every Other Day    And    ascorbic acid  500 mg Oral Daily    sodium chloride flush  5-40 mL IntraVENous 2 times per day    [Held by provider] enoxaparin  40 mg SubCUTAneous Daily    insulin lispro  0-6 Units SubCUTAneous TID WC    insulin lispro  0-3 Units SubCUTAneous Nightly    azithromycin  250 mg Oral Daily    cefdinir  300 mg Oral BID    cetirizine  10 mg Oral Daily    divalproex  500 mg Oral BID    atorvastatin  10 mg Oral Daily    pantoprazole  40 mg Oral QAM AC    amLODIPine  5 mg Oral Daily     Continuous Infusions:    dextrose       PRN Meds: potassium chloride **OR** potassium alternative oral replacement **OR** potassium chloride, sodium chloride flush, ondansetron **OR** ondansetron, polyethylene glycol, acetaminophen **OR** acetaminophen, hydrALAZINE, glucose, dextrose, glucagon (rDNA), dextrose, albuterol    Past History    Past Medical History:   has a past medical history of ASCVD (arteriosclerotic cardiovascular disease), Bipolar 1 disorder ([address] GERD (gastroesophageal reflux disease), Hyperlipidemia, Hypertension, Osteoarthritis, and Type II or unspecified type diabetes mellitus without mention of complication, not stated as uncontrolled. Social History:   reports that he has been smoking. He has a 27.50 pack-year smoking history. He quit smokeless tobacco use about 37 years ago. He reports that he does not drink alcohol and does not use drugs. Family History: Pt has 2 sisters with seizure disorder per pt wife. No other family history able ot be obtained. Physical Examination      Vitals:  BP (!) 141/68   Pulse 94   Temp 97.9 °F (36.6 °C) (Oral)   Resp 18   Ht 5' 11\" (1.803 m)   Wt 191 lb (86.6 kg)   SpO2 93%   BMI 26.64 kg/m²   Temp (24hrs), Av.5 °F (36.9 °C), Min:97.4 °F (36.3 °C), Max:100.2 °F (37.9 °C)      I/O (24Hr):     Intake/Output Summary (Last 24 hours) at 2021 1136  Last data filed at 11/11/2021 0525  Gross per 24 hour   Intake --   Output 600 ml   Net -600 ml       Physical Exam  Vitals reviewed. Constitutional:       Appearance: Normal appearance. HENT:      Head: Normocephalic. Right Ear: External ear normal.      Left Ear: External ear normal.      Nose: Nose normal.      Mouth/Throat:      Mouth: Mucous membranes are moist.      Pharynx: Oropharynx is clear. Eyes:      Extraocular Movements: Extraocular movements intact and EOM normal.      Pupils: Pupils are equal, round, and reactive to light. Cardiovascular:      Rate and Rhythm: Normal rate and regular rhythm. Pulmonary:      Effort: Pulmonary effort is normal. No respiratory distress. Abdominal:      General: There is no distension. Palpations: Abdomen is soft. Tenderness: There is no abdominal tenderness. Musculoskeletal:         General: No deformity or signs of injury. Cervical back: No rigidity or tenderness. Skin:     General: Skin is warm and dry. Neurological:      Mental Status: He is alert. Coordination: Finger-Nose-Finger Test, Heel to [employer] and Romberg Test normal.      Deep Tendon Reflexes:      Reflex Scores:       Bicep reflexes are 2+ on the right side and 2+ on the left side. Patellar reflexes are 2+ on the right side and 2+ on the left side. Psychiatric:         Mood and Affect: Mood normal.         Speech: Speech is slurred. Behavior: Behavior normal.         Thought Content: Thought content normal.       Neurologic Exam     Mental Status   Oriented to person. Oriented to place. Oriented to year and month. Follows 1 step commands. Attention: decreased. Concentration: decreased. Speech: slurred   Level of consciousness: alert    Cranial Nerves     CN II   Visual fields full to confrontation. CN III, IV, VI   Pupils are equal, round, and reactive to light. Extraocular motions are normal.     CN V   Facial sensation intact.      CN VII Facial expression full, symmetric. CN VIII   Hearing: impaired    CN IX, X   Palate: symmetric    CN XI   Right sternocleidomastoid strength: normal  Right trapezius strength: normal    CN XII   CN XII normal.     Motor Exam   Muscle bulk: normal  Overall muscle tone: normal  Strength 4/5 throughout     Sensory Exam   Light touch normal.     Gait, Coordination, and Reflexes     Coordination   Romberg: negative  Finger to nose coordination: normal  Heel to shin coordination: normal    Reflexes   Right biceps: 2+  Left biceps: 2+  Right patellar: 2+  Left patellar: 2+     Labs/Imaging/Diagnostics   Labs:  CBC:  Recent Labs     11/09/21 2013 11/11/21 0514   WBC 9.8 7.6   RBC 4.00* 4.14*   HGB 10.2* 10.1*   HCT 33.9* 33.1*   MCV 84.8 80.0    160     CHEMISTRIES:  Recent Labs     11/09/21 2013 11/11/21 0514   * 133*   K 3.6 3.3*   CL 97* 97*   CO2 19* 22*   BUN 17 16   CREATININE 0.8 0.8   GLUCOSE 124* 132*   MG  --  1.9     PT/INR:No results for input(s): PROTIME, INR in the last 72 hours. APTT:No results for input(s): APTT in the last 72 hours. LIVER PROFILE:  Recent Labs     11/09/21 2013 11/11/21 0514   AST 44* 39   * 81*   BILIDIR  --  <0.2   BILITOT 0.4 0.4   ALKPHOS 91 85       Imaging Last 24 Hours:  XR PELVIS (1-2 VIEWS)    Result Date: 11/9/2021  PROCEDURE: XR PELVIS (1-2 VIEWS) CLINICAL INFORMATION: Fall . TECHNIQUE: Single AP pelvis COMPARISON: No prior study. FINDINGS: No fracture or bone destruction. SI joints are intact. Hip joints are intact. No soft tissue abnormality seen. Negative exam **This report has been created using voice recognition software. It may contain minor errors which are inherent in voice recognition technology. ** Final report electronically signed by Dr. Jenny Reynolds on 11/9/2021 8:11 PM    CT Head WO Contrast    Result Date: 11/9/2021  PROCEDURE: CT HEAD WO CONTRAST CLINICAL INFORMATION: Head injury .  TECHNIQUE: 2-D multiplanar noncontrast CT brain All CT scans at this facility use dose modulation, iterative reconstruction, and/or weight-based dosing when appropriate to reduce radiation dose to as low as reasonably achievable. COMPARISON: No prior study. FINDINGS: Generalized moderate cerebral volume loss. Gray-white differentiation is maintained but there is mild periventricular white matter diminished attenuation. Ventricles are prominent but consistent with the degree of volume loss. There is no hemorrhage or extra-axial fluid collection. There is no evidence of acute infarction. There is a 4 fracture of the right nasal bone this is slightly displaced. Visualized paranasal sinuses and mastoid air cells are clear the exception of the round soft tissue density in the posterior ethmoid sinus chamber on the left possibly a mucous retention cyst.     1. No acute intracranial pathology 2. Comminuted fracture right nasal bone. **This report has been created using voice recognition software. It may contain minor errors which are inherent in voice recognition technology. ** Final report electronically signed by Dr. Bernardo Sinha on 11/9/2021 8:14 PM    CT Cervical Spine WO Contrast    Result Date: 11/9/2021  PROCEDURE: CT CERVICAL SPINE WO CONTRAST CLINICAL INFORMATION: Injury . TECHNIQUE: 2-D multiplanar noncontrast images of the cervical spine bone windows only All CT scans at this facility use dose modulation, iterative reconstruction, and/or weight-based dosing when appropriate to reduce radiation dose to as low as reasonably achievable. COMPARISON: No prior study. FINDINGS: There is straightening of the normal lordosis. There is no acute fracture or acute bony malalignment. Degenerative changes throughout the entire cervical spine. Neural foraminal narrowing at C4-5 and C5-6. No precervical soft tissue swelling. No acute process **This report has been created using voice recognition software.   It may contain minor errors which are inherent in voice recognition technology. ** Final report electronically signed by Dr. Kassie Mcgovern on 11/9/2021 8:45 PM    XR CHEST PORTABLE    Result Date: 11/10/2021  PROCEDURE: XR CHEST PORTABLE CLINICAL INFORMATION: recent pna. zonia, weakness COMPARISON: Chest x-ray 12/11/2012. . TECHNIQUE: AP upright view of the chest. FINDINGS: The heart size is normal.The mediastinum is not widened. There are no pulmonary infiltrates or effusions. The pulmonary vascularity is normal.No suspicious osseous lesions are present. Stable radiographic appearance of the chest. No evidence of an acute process. **This report has been created using voice recognition software. It may contain minor errors which are inherent in voice recognition technology. ** Final report electronically signed by Dr. Marita Luo on 11/10/2021 3:25 PM        Assessment and Plan:        Hospital Problems           Last Modified POA    Altered mental status 11/10/2021 Yes          1. Altered Mental Status  · CTH on 11/9/21- no acute bleed, nasal fracture  · EEG- pending read  · MRI ordered today  · CXR 11/10/21- no acute process  · Continues Azithromycin + Cefdinir  · Covid Negative, Flu Negative  · +Blood cx from [place], E coli and Enterococcus on 11/7/21  · Tx and new Bcx per primary  · B12, Folate Normal  · TSH, Thiamine, Ammonia, Depakote levels ordered today  · Monitor and correct electrolyte derangement  · Neurology will follow    This patient was seen and evaluated with Dr. Wellington Sewell and he is in agreement with the assessment and plan.          Electronically signed by Sandra Griffiths PA-C on 11/11/21 at 11:36 AM EST

## 2021-11-11 NOTE — PROGRESS NOTES
Patient's wife called stating patient has been staring into distance periodically. Wife also states that during last admission patient was supposed to have a neurology consult and never did. Wife is requesting a neurology consult for this admission. Notified resident. Resident will be up to speak with patient soon.

## 2021-11-11 NOTE — PROGRESS NOTES
recheck CMP tomorrow    Recent sepsis d/t pneumonia  -Continue and complete azithromycin and cefdinir courses as that is what he was recently discharged on and does not have any signs of worsened infection at this time    HTN  -Home maxzide on hold d/t hyponatremia; amlodipine with hold parameters for BP management  -Is currently at goal for age    NIDDM, controlled  -Recent A1c on 2021 of 7.3%  -Home metformin on hold at this time  -SSI ordered     Normocytic anemia, JENIFFER  -Not much historical data available in [facility] and care everywhere. Was not present on CBC from 10/30/2019 with no other data points available until recent admission on 2021. Anemia workup ordered.  -W/u c/w iron deficiency anemia. Oral iron + vitamin C ordered - will start ferrous sulfate every other day as this has the same bioavailability as daily dosing but can reduce risk of constipation. B12 and folate are WNL. Bipolar disorder  CAD  HLD  GERD  OA  Chronic bilateral hearing loss  -Continue home meds      Code Status: Full Code    Discharge plannin-2 days pending PT/OT evaluation and discharge planning, social work and case management consulted to aid with placement      Chief Complaint: Weakness, fall with head trauma    Hospital Course: Per admission H&P: Seema Garcia is a 67 y.o. male with PMHx of CAD, hypertension, hyperlipidemia, GERD, bipolar disorder, recently admitted here from [facility] after being found confused at home. Patient was found to have acute respiratory failure secondary to bilateral pneumonia. Patient was found to be negative for Covid prior to transfer from [facility]. In the course of his hospitalization, patient received antibiotic management and demonstrated improvement. Patient was documented to be A+O x3 on 2021, and family members noted that patient's mental status was at baseline.   Patient was therefore discharged home on 2021.     However, it appears that patient sustained a fall upon return home. Patient reportedly hit his head although there was no loss of consciousness. Patient therefore was brought back to the ED. According to the ED notes, patient's wife realized that patient is too weak to ambulate and that she is unable to safely care for him at home.     In the ED, CT of the head without contrast demonstrated no acute intracranial pathology. It did note comminuted fracture of the right nasal bone. CT C-spine did not demonstrate any acute findings. Urinary analysis negative for any acute infection. Chest x-ray is negative for acute findings. \"    11/10: Mild hyponatremia is present. Pt is awake and alert; is only oriented to self. 11/11: Hyponatremia improving, mild hypokalemia present. Lactic is normal. Mental status has not worsened. Neurology consulted d/t concern for seizures. Subjective: Pt seen and examined while he is sitting in bedside chair. Easy to awake with verbal stimulation. He states he slept well last night, was able to eat, get up to walk to bathroom, without any difficulties. Denies CP, SOB, abd pain, nausea, vomiting, diarrhea, leg pain swelling cramping. Of note, wife told nurse pt has been having episodes of staring off into the distance at home.        Medications:  Reviewed    Infusion Medications    dextrose       Scheduled Medications    sodium chloride flush  5-40 mL IntraVENous 2 times per day    [Held by provider] enoxaparin  40 mg SubCUTAneous Daily    insulin lispro  0-6 Units SubCUTAneous TID WC    insulin lispro  0-3 Units SubCUTAneous Nightly    azithromycin  250 mg Oral Daily    cefdinir  300 mg Oral BID    cetirizine  10 mg Oral Daily    divalproex  500 mg Oral BID    atorvastatin  10 mg Oral Daily    pantoprazole  40 mg Oral QAM AC    amLODIPine  5 mg Oral Daily     PRN Meds: potassium chloride **OR** potassium alternative oral replacement **OR** potassium chloride, sodium chloride flush, ondansetron **OR** ondansetron, polyethylene glycol, acetaminophen **OR** acetaminophen, hydrALAZINE, glucose, dextrose, glucagon (rDNA), dextrose, albuterol      Intake/Output Summary (Last 24 hours) at 11/11/2021 1014  Last data filed at 11/11/2021 0525  Gross per 24 hour   Intake --   Output 600 ml   Net -600 ml       Diet:  ADULT DIET; Regular; 4 carb choices (60 gm/meal)    Exam:  BP (!) 141/68   Pulse 94   Temp 97.9 °F (36.6 °C) (Oral)   Resp 18   Ht 5' 11\" (1.803 m)   Wt 191 lb (86.6 kg)   SpO2 93%   BMI 26.64 kg/m²     General appearance: No apparent distress, appears stated age and cooperative. HEENT: Pupils equal, round, and reactive to light. Conjunctivae/corneas clear. Neck: Supple, with full range of motion. No jugular venous distention. Trachea midline. Respiratory:  Normal respiratory effort. Clear to auscultation, bilaterally without Rales/Wheezes/Rhonchi. Cardiovascular: Regular rate and rhythm with normal S1/S2 without murmurs, rubs or gallops. Abdomen: Soft, non-tender, non-distended with normal bowel sounds. Musculoskeletal: passive and active ROM x 4 extremities. Skin: Skin color, texture, turgor normal.  No rashes or lesions. Neurologic:  Neurovascularly intact without any focal sensory/motor deficits. Cranial nerves: II-XII intact, grossly non-focal.  Psychiatric: Alert and oriented to self and place, not to time  Capillary Refill: Brisk,< 3 seconds   Peripheral Pulses: +2 palpable, equal bilaterally       Labs:   Recent Labs     11/09/21 2013 11/11/21 0514   WBC 9.8 7.6   HGB 10.2* 10.1*   HCT 33.9* 33.1*    160     Recent Labs     11/09/21 2013 11/11/21 0514   * 133*   K 3.6 3.3*   CL 97* 97*   CO2 19* 22*   BUN 17 16   CREATININE 0.8 0.8   CALCIUM 8.9 8.7     Recent Labs     11/09/21 2013   AST 44*   *   BILITOT 0.4   ALKPHOS 91     No results for input(s): INR in the last 72 hours. No results for input(s): [place] Ill in the last 72 hours.     Urinalysis:      Lab Results Component Value Date    NITRU NEGATIVE 11/09/2021    WBCUA 0-2 11/09/2021    BACTERIA NONE SEEN 11/09/2021    RBCUA 0-2 11/09/2021    BLOODU TRACE 11/09/2021    SPECGRAV 1.020 11/07/2016    GLUCOSEU 100 11/09/2021       Radiology:  XR CHEST PORTABLE   Final Result   Stable radiographic appearance of the chest. No evidence of an acute process. **This report has been created using voice recognition software. It may contain minor errors which are inherent in voice recognition technology. **      Final report electronically signed by Dr. Bebo Hart on 11/10/2021 3:25 PM      CT Head WO Contrast   Final Result   1. No acute intracranial pathology   2. Comminuted fracture right nasal bone. **This report has been created using voice recognition software. It may contain minor errors which are inherent in voice recognition technology. **      Final report electronically signed by Dr. Chinmay Che on 11/9/2021 8:14 PM      CT Cervical Spine WO Contrast   Final Result   No acute process            **This report has been created using voice recognition software. It may contain minor errors which are inherent in voice recognition technology. **      Final report electronically signed by Dr. Chinmay Che on 11/9/2021 8:45 PM      XR PELVIS (1-2 VIEWS)   Final Result   Negative exam            **This report has been created using voice recognition software. It may contain minor errors which are inherent in voice recognition technology. **      Final report electronically signed by Dr. Chinmay Che on 11/9/2021 8:11 PM          Diet: ADULT DIET;  Regular; 4 carb choices (60 gm/meal)    PT/OT Eval Status: consulted    DVT prophylaxis: [] Lovenox                                 [x] SCDs                                 [] SQ Heparin                                 [x] Encourage ambulation           [] Already on Anticoagulation     Disposition:    [] Home       [] TCU       [x] Rehab       [] Psych       [x] [facility]       [] Emily       [] Other-    Case discussed with Dr. Kevin Ford    Electronically signed by Wilman Conte MD on 11/11/2021 at 10:14 AM

## 2021-11-11 NOTE — PROGRESS NOTES
IP rehab came by to evaluate patient but is currently confused and drowsy. Just received new report from [facility] that blood cultures are positive from 11/7. They stated they will re-evaluate at a later date.

## 2021-11-11 NOTE — PROGRESS NOTES
[facility]  INPATIENT OCCUPATIONAL THERAPY  STRZ RENAL TELEMETRY 6K  EVALUATION    Time:   Time In: 9560  Time Out: 3272  Timed Code Treatment Minutes: 23 Minutes  Minutes: 32          Date: 2021  Patient Name: Alex Link,   Gender: male      MRN: [id number]  : 1945  (67 y.o.)  Referring Practitioner: Anne Senior MD  Diagnosis: Altered mental status  Additional Pertinent Hx: Micaela Blair is a 67 y.o. male with PMHx of CAD, hypertension, hyperlipidemia, GERD, bipolar disorder, recently admitted here from [facility] after being found confused at home. Patient was found to have acute respiratory failure secondary to bilateral pneumonia. Patient was found to be negative for Covid prior to transfer from [facility]. In the course of his hospitalization, patient received antibiotic management and demonstrated improvement. Patient was documented to be A+O x3 on 2021, and family members noted that patient's mental status was at baseline. Patient was therefore discharged home on 2021. However, it appears that patient sustained a fall upon return home. Patient reportedly hit his head although there was no loss of consciousness. Patient therefore was brought back to the ED. According to the ED notes, patient's wife realized that patient is too weak to ambulate and that she is unable to safely care for him at home. In the ED, CT of the head without contrast demonstrated no acute intracranial pathology. It did note comminuted fracture of the right nasal bone. CT C-spine did not demonstrate any acute findings. Urinary analysis negative for any acute infection. Chest x-ray is negative for acute findings.     Restrictions/Precautions:  Restrictions/Precautions: Fall Risk, General Precautions    Subjective  Chart Reviewed: Yes, Orders, History and Physical, Progress Notes  Patient assessed for rehabilitation services?: Yes  Family / Caregiver Present: No    Subjective: RN approved OT session. Pt supine in bed on arrival and agreeable to OT. Pt oriented x1 this date     Pain:  Pain Assessment  Patient Currently in Pain: Denies    Vitals: Vitals not assessed per clinical judgement, see nursing flowsheet    Social/Functional History:  Lives With: Spouse  Type of Home: House  Home Layout: Two level, Able to Live on Main level with bedroom/bathroom  Home Access: Stairs to enter with rails  Entrance Stairs - Number of Steps: 2 steps with 1 rail  Home Equipment: Cane, Rolling walker   Bathroom Shower/Tub: Walk-in shower, Shower chair with back  Bathroom Toilet: Standard  Bathroom Equipment: Grab bars in shower    Receives Help From: Family  ADL Assistance: Independent  Homemaking Assistance: Needs assistance  Homemaking Responsibilities: Yes  Ambulation Assistance: Independent  Transfer Assistance: Independent    Active : No     Additional Comments: Pt reports that he did not use AD in PLOF generally, but sometimes used a cane to walk to the mailbox or when walking into the kitchen. VISION:Corrected    HEARING:  [place]     COGNITION: Slow Processing, Decreased Recall, Decreased Insight, Decreased Problem Solving, Decreased Safety Awareness and Difficulty Following Commands    RANGE OF MOTION:  Bilateral Upper Extremity:  WFL    STRENGTH:  Bilateral Upper Extremity:  WFL    SENSATION:   WFL    ADL:   Lower Extremity Dressing: Contact Guard Assistance. donning new depends & adjusting bilateral socks. Required verbal commands to be repeated x3 times to complete LB ADL   Toileting: Contact Guard Assistance. negro care and clothing management   Toilet Transfer: Contact Guard Assistance. on to standard toilet. 1 vc for hand placement. BALANCE:  Sitting Balance:  Supervision. Standing Balance: Contact Guard Assistance. with 1 UE release for x2 minutes. BED MOBILITY:  Supine to Sit: Moderate Assistance, X 1, with increased time for completion.  A for trunk     TRANSFERS:  Sit to Stand: time frame for expected duration of plan of care. If no long-term goals established, a short length of stay is anticipated. Goals:  Patient goals : Did not state  Short term goals  Time Frame for Short term goals: By discharge  Short term goal 1: Pt will complete functional mobility to/from BR, progressing to [place] distances with SBA and good safety awareness to access ADLs  Short term goal 2: Patient to complete dynamic standing task x5 minutes with at least 1 UE release and SBA to increase indep with toileting tasks  Short term goal 3: Patient to complete BADL routine with SBA and requiring no > than 1 verbal command to increase indep ADL completion. Short term goal 4: Patient to complete BUE mod resistive HEP with min cues for technique to increase strength and endurance needed for all mobility and ADL tasks. Short term goal 5: pt will be oriented x3 with no more than 1 verbal prompt to increase alertness in completeing ADLs         Following session, patient left in safe position with all fall risk precautions in place.

## 2021-11-11 NOTE — PROCEDURES
[address] [place] [address]                          ELECTROENCEPHALOGRAM REPORT    PATIENT NAME: Adia Gary                    :        1945  MED REC NO:   [id number]                           ROOM:       0005  ACCOUNT NO:   [de-identified]                           ADMIT DATE: 2021  PROVIDER:     Morgan Mckeon. Venancio Ashford MD    DATE OF EE2021    REFERRING PROVIDER:  Martha Landers    CLINICAL HISTORY:  A 65-year-old male presenting with a fall at home. The patient has history of pneumonia, encephalopathy. Evaluate for  seizure. Medications listed are ferrous sulfate, ascorbic acid,  insulin, azithromycin, cefdinir, cetirizine, Depakote, atorvastatin,  pantoprazole, amlodipine. CLINICAL INTERPRETATION:  This is a 17-channel EEG performed without  sleep deprivation. Hyperventilation was not performed. Photic  stimulation was performed. The patient is described as alert. The background rhythm activity noted to be 8 Hz in the posterior  parietal area, symmetric, attenuates with eye opening. The patient is  noted to be drowsy during parts of recording. Hyperventilation was not  performed. Light stages of sleep are seen during the recording. Excessive slowing was seen in the theta range suggestive of cortical  dysfunction such as seen with encephalopathy. Photic stimulation was  performed with poor driving seen. Light stages of sleep are seen during  the recording. There was no clinical seizures observed. IMPRESSION:  This is an abnormal EEG due to the excessive slowing seen  in the theta range suggestive of cortical dysfunction such as seen with  encephalopathy. There was no definitive evidence of epileptiform  activity appreciated.         Selby Simmonds, MD    D: 2021 16:37:00       T: 2021 16:40:34     [email or web address]  Job#: [id number]     Doc#: [id number]    CC:

## 2021-11-11 NOTE — CARE COORDINATION
11/11/21, 1:42 PM EST  DISCHARGE PLANNING EVALUATION:    Tao Guaman       Admitted: 11/9/2021/ To 1343 day: 1   Location: [facility]05/005-A Reason for admit: Altered mental status [R41.82]  Closed fracture of nasal bone, initial encounter [S02. 2XXA]  Injury of head, initial encounter [S09.90XA]   PMH:  has a past medical history of ASCVD (arteriosclerotic cardiovascular disease), Bipolar 1 disorder ([facility] [address] GERD (gastroesophageal reflux disease), Hyperlipidemia, Hypertension, Osteoarthritis, and Type II or unspecified type diabetes mellitus without mention of complication, not stated as uncontrolled. Procedure: none  Barriers to Discharge:  K+ 3.3, ABGs abn. MRI brain pending. PT/OT evals. Physiatry evaluation. PCP: Omkar Rosales MD  Readmission Risk Score: 14.2 ( )%    Patient Goals/Plan/Treatment Preferences: [facility] following, plan is for [employer] vs [employer]. Transportation/Food Security/Housekeeping Addressed:  No issues identified.

## 2021-11-11 NOTE — PROGRESS NOTES
to bed after amb    General:                      Pain: no pain per pt    Vitals: Vitals not assessed per clinical judgement, see nursing flowsheet    Social/Functional History:    Lives With: Spouse  Type of Home: House  Home Layout: Two level, Able to Live on Main level with bedroom/bathroom  Home Access: Stairs to enter with rails  Entrance Stairs - Number of Steps: 2 steps with 1 rail  Home Equipment: Cane, Rolling walker     Bathroom Shower/Tub: Walk-in shower, Shower chair with back  Bathroom Toilet: Standard  Bathroom Equipment: Grab bars in shower    Receives Help From: Family  ADL Assistance: Independent  Homemaking Assistance: Needs assistance  Homemaking Responsibilities: Yes  Ambulation Assistance: Independent  Transfer Assistance: Independent    Active : No     Additional Comments: Pt reports that he did not use AD in PLOF generally, but sometimes used a cane to walk to the mailbox or when walking into the kitchen. OBJECTIVE:  Range of Motion:  Bilateral Lower Extremity: WFL    Strength:  Bilateral Lower Extremity: >/=3/5, generalized weakness    Balance:  Static Sitting Balance:  Stand By Assistance  Static Standing Balance: Minimal Assistance, without UE support    Bed Mobility:  Rolling to Left: Stand By Assistance   Sit to Supine: Minimal Assistance, at BLE, HOB flat   Scooting: Stand By Assistance    Transfers:  Sit to Stand: Minimal Assistance  Stand to Sit:Contact Guard Assistance    Ambulation:  Minimal Assistance  Distance: 4'x1, 15'x1  Surface: Level Tile  Device:Hand-Held Assist  Gait Deviations: Forward Flexed Posture, Slow Gaby, Moderate Path Deviations and Unsteady Gait    Exercise:  Patient was guided in 1 set(s) 5-8 reps of exercise to both lower extremities. Ankle pumps, Heelslides, Hip abduction/adduction and Straight leg raises. Exercises were completed for increased independence with functional mobility.     Functional Outcome Measures: Completed  AM-PAC Inpatient Mobility without Stair Climbing Raw Score : 15  AM-PAC Inpatient without Stair Climbing T-Scale Score : 43.03    ASSESSMENT:  Activity Tolerance:  Patient tolerance of  treatment: fair. Treatment Initiated: Treatment and education initiated within context of evaluation. Evaluation time included review of current medical information, gathering information related to past medical, social and functional history, completion of standardized testing, formal and informal observation of tasks, assessment of data and development of plan of care and goals. Treatment time included skilled education and facilitation of tasks to increase safety and independence with functional mobility for improved independence and quality of life. Assessment:   Body structures, Functions, Activity limitations: Decreased functional mobility , Decreased strength, Decreased balance, Decreased endurance  Assessment: pt tolerated fair, s/p fall and confused at home, generalized weakness, dec balance, dec endurance, inc assist for safe mobility, recommend cont PT to inc pt functional mobility  Prognosis: Good    REQUIRES PT FOLLOW UP: Yes    Discharge Recommendations:  Discharge Recommendations: Continue to assess pending progress, Subacute/Skilled Nursing Facility, Patient would benefit from continued therapy after discharge    Patient Education:  PT Education: Goals, PT Role, Plan of Care, Home Exercise Program, Functional Mobility Training    Equipment Recommendations:  Equipment Needed: No    Plan:  Times per week: 3-5X GM  Times per day: Daily  Specific instructions for Next Treatment: therex and mobility, balance activities    Goals:  Patient goals : feel better  Short term goals  Time Frame for Short term goals: by discharge  Short term goal 1: Pt to be SBA for supine <> sit to get in/out of bed  Short term goal 2: Pt to be SBA for sit <> stand to get up to ambulate  Short term goal 3: Pt to ambulate 50 ft with LRAD with SBA to walk safely in room  Long term goals  Time Frame for Long term goals : not set due to short ELOS    Following session, patient left in safe position with all fall risk precautions in place.

## 2021-11-11 NOTE — PROGRESS NOTES
[facility] Laboratory Technician Worksheet      EEG Date: 2021    Name: Galina Priest   :    Age: 67 y.o. SEX: male    ROOM: [facility] MRN: [id number]           CSN: [id number]      Ordering Provider: Cabrera Earl MD  EEG Number: [id number] Time of Test:  9234    Hand: Unknown   Sedation: no    H.V. Done: No age protocol Photic: Yes    Sleep: Yes  Drowsy: Yes   Sleep Deprived: No    Seizures observed: no    Mentality: alert upon entering room, then drowsy and unable to fully wake during EEG and after. Clinical History: Patient to ED 21 after fall at home. Patient previously discharged from hospital a few hours earlier after admission for pneumonia and encephalopathy as a transfer from [facility] on 21. Prior to original admission wife states that patient had behavior changes for the preceding several weeks consisting of fidgeting, jumping up and down and staring into space. Wife states that patient would remain responsive during staring episodes. Wife states that patient had improved prior to discharge, but not back to baseline. Patient alert and oriented x3 while being examined by Neurology PA Gary Ogden. Patient became drowsy and fell asleep during EEG hookup. I attempted to wake the patient to perform eyes open/eyes closed procedures as well as photic stimulation, but patient could not stay awake even when I was moving his leg and speaking to him continuously. When I asked which hand he wrote with he was not able to answer and just looked at me. Depakote 500mg oral BID, last dose 21 0853    CT Head 21  Impression   1. No acute intracranial pathology   2. Comminuted fracture right nasal bone.          Past Medical History:       Diagnosis Date    ASCVD (arteriosclerotic cardiovascular disease)     Bipolar 1 disorder (HCC)     GERD (gastroesophageal reflux disease)     Hyperlipidemia     Hypertension     Osteoarthritis     Type II or unspecified type diabetes mellitus without mention of complication, not stated as uncontrolled        Scheduled Meds:   ferrous sulfate  325 mg Oral Every Other Day    And    ascorbic acid  500 mg Oral Daily    sodium chloride flush  5-40 mL IntraVENous 2 times per day    [Held by provider] enoxaparin  40 mg SubCUTAneous Daily    insulin lispro  0-6 Units SubCUTAneous TID WC    insulin lispro  0-3 Units SubCUTAneous Nightly    azithromycin  250 mg Oral Daily    cefdinir  300 mg Oral BID    cetirizine  10 mg Oral Daily    divalproex  500 mg Oral BID    atorvastatin  10 mg Oral Daily    pantoprazole  40 mg Oral QAM AC    amLODIPine  5 mg Oral Daily     Continuous Infusions:   dextrose       PRN Meds:.potassium chloride **OR** potassium alternative oral replacement **OR** potassium chloride, sodium chloride flush, ondansetron **OR** ondansetron, polyethylene glycol, acetaminophen **OR** acetaminophen, hydrALAZINE, glucose, dextrose, glucagon (rDNA), dextrose, albuterol    Technician: Mike Jeffries 11/11/2021

## 2021-11-11 NOTE — FLOWSHEET NOTE
Blood cultures from outside hospital reviewed. Organism #1 is sensitive to ceftriaxone. Organism #2 is sensitive to ampicillin. Pt did not come in with signs of worsened or new infection. Will continue current abx (azithromycin and cefdinir) and get two new blood cultures.      Electronically signed by Cabrera Earl MD on 11/11/2021 at 3:30 PM

## 2021-11-11 NOTE — CONSULTS
completion. Distance:  To and from bathroom  Slow pace, min cuing for safety         ST:  N/A      Past Medical History:        Diagnosis Date    ASCVD (arteriosclerotic cardiovascular disease)     Bipolar 1 disorder ([facility] [address]     GERD (gastroesophageal reflux disease)     Hyperlipidemia     Hypertension     Osteoarthritis     Type II or unspecified type diabetes mellitus without mention of complication, not stated as uncontrolled        Past Surgical History:        Procedure Laterality Date    FOOT SURGERY Left     for bone fracture ; ? ORIF    STOMACH SURGERY         Allergies:    No Known Allergies       Current Medications:   Current Facility-Administered Medications   Medication Dose Route Frequency Provider Last Rate Last Admin    potassium chloride (KLOR-CON M) extended release tablet 40 mEq  40 mEq Oral PRN Angelo Page MD   40 mEq at 11/11/21 7075    Or    potassium bicarb-citric acid (EFFER-K) effervescent tablet 40 mEq  40 mEq Oral PRN Angelo Page MD        Or   Ashlee Post potassium chloride 10 mEq/100 mL IVPB (Peripheral Line)  10 mEq IntraVENous PRN Angelo Page MD        ferrous sulfate (IRON 325) tablet 325 mg  325 mg Oral Every Other Day Angelo Page MD   325 mg at 11/11/21 1202    And    ascorbic acid (VITAMIN C) tablet 500 mg  500 mg Oral Daily Angelo Page MD   500 mg at 11/11/21 1202    sodium chloride flush 0.9 % injection 5-40 mL  5-40 mL IntraVENous 2 times per day Neetu Rm MD   10 mL at 11/11/21 0853    sodium chloride flush 0.9 % injection 5-40 mL  5-40 mL IntraVENous PRN Neetu Rm MD        [Held by provider] enoxaparin (LOVENOX) injection 40 mg  40 mg SubCUTAneous Daily Neetu Rm MD        ondansetron (ZOFRAN-ODT) disintegrating tablet 4 mg  4 mg Oral Q8H PRN Neetu Rm MD        Or    ondansetron [facility]) injection 4 mg  4 mg IntraVENous Q6H PRN Neetu Rm MD        polyethylene glycol [facility]) packet 17 g  17 g Oral Daily PRN MD Ashlee Shafer acetaminophen (TYLENOL) tablet 650 mg  650 mg Oral Q6H PRN Chloé Connolly MD        Or    acetaminophen (TYLENOL) suppository 650 mg  650 mg Rectal Q6H PRN Chloé Connolly MD        hydrALAZINE (APRESOLINE) injection 10 mg  10 mg IntraVENous Q6H PRN Chloé Connolly MD        insulin lispro (HUMALOG) injection vial 0-6 Units  0-6 Units SubCUTAneous TID WC Chloé Connolly MD        insulin lispro (HUMALOG) injection vial 0-3 Units  0-3 Units SubCUTAneous Nightly Chloé Connolly MD        [facility]) tablet 250 mg  250 mg Oral Daily Dorjimenez Iron, MD   250 mg at 11/11/21 2379    cefdinir (OMNICEF) capsule 300 mg  300 mg Oral BID Charlie Shipman MD   300 mg at 11/11/21 0853    cetirizine (ZYRTEC) tablet 10 mg  10 mg Oral Daily Charlie Shipman MD   10 mg at 11/11/21 0853    divalproex (DEPAKOTE) DR tablet 500 mg  500 mg Oral BID Dorjimenez Shipman MD   500 mg at 11/11/21 1724    atorvastatin (LIPITOR) tablet 10 mg  10 mg Oral Daily Charlie Shipman MD   10 mg at 11/11/21 0853    pantoprazole (PROTONIX) tablet 40 mg  40 mg Oral QAM AC Charlie Shipman MD   40 mg at 11/11/21 0530    amLODIPine (NORVASC) tablet 5 mg  5 mg Oral Daily Charlie Shipman MD   5 mg at 11/11/21 0853    glucose (GLUTOSE) 40 % oral gel 15 g  15 g Oral PRN Charlie Shipman MD        dextrose 50 % IV solution  12.5 g IntraVENous PRN Charlie Shipman MD        glucagon (rDNA) injection 1 mg  1 mg IntraMUSCular PRN Charlie Shipman MD        dextrose 5 % solution  100 mL/hr IntraVENous PRN Charlie Shipman MD        albuterol (PROVENTIL) nebulizer solution 2.5 mg  2.5 mg Nebulization Q4H PRN Charlie Shipman MD           Social History:  Social History     Socioeconomic History    Marital status:      Spouse name: Not on file    Number of children: Not on file    Years of education: Not on file    Highest education level: Not on file   Occupational History    Not on file   Tobacco Use    Smoking status: Current Every Day Smoker     Packs/day: 0.50     Types: Cigarettes     Start date: 1984    Smokeless tobacco: Never Used   Substance and Sexual Activity    Alcohol use: No     Alcohol/week: 0.0 standard drinks    Drug use: No    Sexual activity: Not on file   Other Topics Concern    Not on file   Social History Narrative    Not on file     Social Determinants of Health     Financial Resource Strain:     Difficulty of Paying Living Expenses: Not on file   Food Insecurity:     Worried About Running Out of Food in the Last Year: Not on file    Jeremiah of Food in the Last Year: Not on file   Transportation Needs:     Lack of Transportation (Medical): Not on file    Lack of Transportation (Non-Medical):  Not on file   Physical Activity:     Days of Exercise per Week: Not on file    Minutes of Exercise per Session: Not on file   Stress:     Feeling of Stress : Not on file   Social Connections:     Frequency of Communication with Friends and Family: Not on file    Frequency of Social Gatherings with Friends and Family: Not on file    Attends Islam Services: Not on file    Active Member of [address] [place] [address] or Organizations: Not on file    Attends Club or Organization Meetings: Not on file    Marital Status: Not on file   Intimate Partner Violence:     Fear of Current or Ex-Partner: Not on file    Emotionally Abused: Not on file    Physically Abused: Not on file    Sexually Abused: Not on file   Housing Stability:     Unable to Pay for Housing in the Last Year: Not on file    Number of [place] in the Last Year: Not on file    Unstable Housing in the Last Year: Not on file     Occupation: Patient unable to provide information ; his son said the patient was a   Lives with: His wife (patient's wife had knee replacement done and uses a cane to assist walking according to patient's son)  Home setup: Two-level house with 2 steps outside front door; his bedroom and bathroom are on the first floor; there is 1 step down to the kitchen area  Previous level of independence: Independent in all ADLs, ambulation without using any assistive walking device; patient says he drives        Family History:   History reviewed. No pertinent family history. Review of Systems:  Review of Systems   Unable to perform ROS: Mental status change (impaired hearing acuity )   Constitutional: Negative for chills and fever. HENT: Positive for hearing loss. Negative for rhinorrhea and sneezing. Eyes: Negative for discharge and visual disturbance. Respiratory: Negative for cough, shortness of breath and wheezing. Cardiovascular: Negative for leg swelling. Gastrointestinal: Negative for constipation, diarrhea, nausea and vomiting. Musculoskeletal: Positive for gait problem. Negative for arthralgias and myalgias. Skin: Negative for rash. Neurological: Negative for facial asymmetry, speech difficulty, weakness and numbness. Psychiatric/Behavioral: Positive for confusion. Negative for dysphoric mood and hallucinations. The patient is not nervous/anxious.         Physical Exam:  BP (!) 141/63   Pulse 92   Temp 97.3 °F (36.3 °C) (Oral)   Resp 18   Ht 5' 11\" (1.803 m)   Wt 191 lb (86.6 kg)   SpO2 91%   BMI 26.64 kg/m²   Physical Exam  General:  well-developed, well nourished  male; in no acute distress ; slightly flat affect & mood; poor hearing acuity; answering questions inappropriately frequently; examination performed with the presence of his son  Eyes: pupil equally round ; extra-ocular motion intact bilaterally  Head, Ear, Nose, Mouth & Throat : normocephalic ; no tenderness at face or head scalp; no discharge from ears or nose ; no deformity ; no facial swelling ; oral mucosa pink   Neck :  supple ; no tenderness ; no muscle spasm  Cardiovascular : regular rate & rhythm ; normal S1 & S2 heart sound ; no murmur ; normal peripheral pulse at bilateral upper extremities; slightly reduced pulse strength at the bilateral lower extremities  Pulmonary : lung clear to auscultation ; no wheezing ; no rale  Gastrointestinal : soft, slightly protruded abdomen without tenderness ; normal bowel sound present   Back : no tenderness; no muscle spasm  Skin: no skin lesion or rash ; no pitting edema at all 4 extremities  Musculoskeletal : no limb asymmetry; no limb deformity; no tenderness at bilateral upper & lower extremities; no palpable mass at limbs ; no joints laxity or crepitation ; shoulder flexion and abduction passive ROM reaching 140 degrees bilaterally; hip flexion passive ROM reaching 110 degrees bilaterally; ankle dorsiflexion passive ROM reaching 5 degrees bilaterally; otherwise normal functional joints ROM at the rest of bilateral upper & lower extremities  Cerebral :  alert ; awake ; oriented to place, month and himself; not oriented to year or his son (he says his son is his brother) ; follow one-step verbal or gesture command most of the time but not consistently  Cerebellum : no dysmetria with bilateral finger-to-nose test ; mild dysmetria with bilateral heel-to-shin test  Cranial Nerves :  grossly intact CN II to XII function  Sensory : intact light touch and pin prick sensation at bilateral upper & lower extremities  Motor : normal tone at bilateral upper & lower extremities ; 4+/5 muscle strength at bilateral finger extension and handgrip; 4-/5 muscle strength at the bilateral finger abduction; 4+/5 muscle strength at the bilateral hip flexion; otherwise normal 5/5 muscle strength at the rest of bilateral upper & lower extremities  Reflex : 1+ bilateral knees reflexes; 0 bilateral biceps, bilateral triceps, bilateral brachioradialis and bilateral ankles reflexes  Pathological Reflex :  No Yessi's sign ; no Babinski sign ; no ankle clonus  Gait : Not assessed      Diagnostics:  Recent Results (from the past 24 hour(s))   Basic Metabolic Panel w/ Reflex to MG    Collection Time: 11/11/21  5:14 AM   Result Value Ref Range    Sodium 133 (L) 135 - 145 meq/L    Potassium reflex Magnesium 3.3 (L) 3.5 - 5.2 meq/L    Chloride 97 (L) 98 - 111 meq/L    CO2 22 (L) 23 - 33 meq/L    Glucose 132 (H) 70 - 108 mg/dL    BUN 16 7 - 22 mg/dL    CREATININE 0.8 0.4 - 1.2 mg/dL    Calcium 8.7 8.5 - 10.5 mg/dL   CBC    Collection Time: 11/11/21  5:14 AM   Result Value Ref Range    WBC 7.6 4.8 - 10.8 thou/mm3    RBC 4.14 (L) 4.70 - 6.10 mill/mm3    Hemoglobin 10.1 (L) 14.0 - 18.0 gm/dl    Hematocrit 33.1 (L) 42.0 - 52.0 %    MCV 80.0 80.0 - 94.0 fL    MCH 24.4 (L) 26.0 - 33.0 pg    MCHC 30.5 (L) 32.2 - 35.5 gm/dl    RDW-CV 15.3 (H) 11.5 - 14.5 %    RDW-SD 44.1 35.0 - 45.0 fL    Platelets 362 605 - 629 thou/mm3    MPV 10.2 9.4 - 12.4 fL   Anion Gap    Collection Time: 11/11/21  5:14 AM   Result Value Ref Range    Anion Gap 14.0 8.0 - 16.0 meq/L   Glomerular Filtration Rate, Estimated    Collection Time: 11/11/21  5:14 AM   Result Value Ref Range    Est, Glom Filt Rate >90 ml/min/1.73m2   Magnesium    Collection Time: 11/11/21  5:14 AM   Result Value Ref Range    Magnesium 1.9 1.6 - 2.4 mg/dL   Hepatic Function Panel    Collection Time: 11/11/21  5:14 AM   Result Value Ref Range    Albumin 3.1 (L) 3.5 - 5.1 g/dL    Total Bilirubin 0.4 0.3 - 1.2 mg/dL    Bilirubin, Direct <0.2 0.0 - 0.3 mg/dL    Alkaline Phosphatase 85 38 - 126 U/L    AST 39 5 - 40 U/L    ALT 81 (H) 11 - 66 U/L    Total Protein 6.4 6.1 - 8.0 g/dL   POCT Glucose    Collection Time: 11/11/21 11:59 AM   Result Value Ref Range    POC Glucose 129 (H) 70 - 108 mg/dl   TSH with Reflex    Collection Time: 11/11/21 12:33 PM   Result Value Ref Range    TSH 5.820 (H) 0.400 - 4.200 uIU/mL   Ammonia    Collection Time: 11/11/21 12:33 PM   Result Value Ref Range    Ammonia 35 11 - 60 umol/L   Valproic acid level, total    Collection Time: 11/11/21 12:33 PM   Result Value Ref Range    Valproic Acid Lvl 83.5 50.0 - 100.0 ug/mL   T4, Free    Collection Time: 11/11/21 12:33 PM   Result Value Ref Range    T4 Free 0.88 (L) 0.93 - 1.76 ng/dL   Blood gas, venous    Collection Time: 11/11/21  1:14 PM   Result Value Ref Range    PH MIXED 7.51 (H) 7.31 - 7.41    PCO2, MIXED VENOUS 28 (L) 41 - 51 mmhg    PO2, Mixed 48 (H) 25 - 40 mmhg    HCO3, Mixed 22 (L) 23 - 28 mmol/l    Base Exc, Mixed 0.1 -2.0 - 3.0 mmol/l    O2 Sat, Mixed 88 %    COLLECTED BY: [id number]    POCT Glucose    Collection Time: 11/11/21  4:43 PM   Result Value Ref Range    POC Glucose 109 (H) 70 - 108 mg/dl       X-ray of pelvis (11/9/2021) : Impression   Negative exam       CT of head without contrast (11/9/2021) : Impression   1. No acute intracranial pathology   2. Comminuted fracture right nasal bone. CT of cervical spine without contrast (11/9/2021) : Impression   No acute process     Portable chest x-ray (11/10/2021) : Impression   Stable radiographic appearance of the chest. No evidence of an acute process. Electroencephalogram (11/11/2021) : IMPRESSION:  This is an abnormal EEG due to the excessive slowing seen  in the theta range suggestive of cortical dysfunction such as seen with  encephalopathy. There was no definitive evidence of epileptiform  activity appreciated.       Impression:  · Debility / physical decondition with delirium, cognitive impairment likely due to E. coli and Enterococcus gallinarum bacteremia with encephalopathy  · Right nasal bone commuted fracture secondary to fall  · Recent history of bilateral lower lobe pneumonia with acute respiratory failure  · Diabetes mellitus type 2  · Hypertension  · Hyperlipidemia  · GERD  · Coronary artery disease  · History of bipolar disorder  · Impaired hearing acuity      Recommendations:  · Speech therapy evaluation and treatment ordered for impaired cognition  · Continue current PT and OT treatment while the patient remains in acute hospital  · Waiting for MRI of brain with and without contrast to be performed  · Continue antibiotic treatment as per medical service  · Waiting for repeated blood culture result  · We will continue following the patient for his rehab need and further rehab recommendation        It was my pleasure to evaluate Synetta Kinds today. Please call with questions.     Tabatha Montalvo MD

## 2021-11-11 NOTE — CARE COORDINATION
DISCHARGE/PLANNING EVALUATION  11/11/21, 11:42 AM EST    Reason for Referral: discharge planning   Mental Status: alert  Decision Making: self with assistance from family  Family/Social/Home Environment: lives at home with his wife, they have children that live locally for support. Pt presents as somewhat confused at times, pt is [facility], reports he has some amplifiers to help with hearing. SW discussed role. Discussed considering rehab at discharge, pt states he is not sure, asked SW to call wife  Current Services including food security, transportation and housekeeping: wife does cooking and local transportation, children help with longer transports, pt did help with household chore prior to recent admissions, pt did his own personal cares  Current Equipment:cane  Payment Source:Medicare  Concerns or Barriers to Discharge: none  Post acute provider list with quality measures, geographic area and applicable managed care information provided. Questions regarding selection process answered:offered, wife reports pt needing rehab of some capacity. SW did talk with CM, [facility] consulted. Wife reports first choice for ECF would be [facility], then [place] [id number] or [facility] and [address] [place] [address]. Teach Back Method used with patient and wife regarding care plan and nneds  Patient and wife verbalize understanding of the plan of care and contribute to goal setting. Patient goals, treatment preferences and discharge plan: IPR vs ECF. SW did call [facility], back up referral made in case IP Rehab cannot take.      Electronically signed by Marlo Ramirez on 11/11/2021 at 11:42 AM

## 2021-11-12 LAB
ALBUMIN SERPL-MCNC: 3.3 G/DL (ref 3.5–5.1)
ALP BLD-CCNC: 84 U/L (ref 38–126)
ALT SERPL-CCNC: 71 U/L (ref 11–66)
ANION GAP SERPL CALCULATED.3IONS-SCNC: 14 MEQ/L (ref 8–16)
AST SERPL-CCNC: 33 U/L (ref 5–40)
BASOPHILS # BLD: 0.5 %
BASOPHILS ABSOLUTE: 0 THOU/MM3 (ref 0–0.1)
BILIRUB SERPL-MCNC: 0.5 MG/DL (ref 0.3–1.2)
BUN BLDV-MCNC: 18 MG/DL (ref 7–22)
CALCIUM SERPL-MCNC: 8.7 MG/DL (ref 8.5–10.5)
CHLORIDE BLD-SCNC: 101 MEQ/L (ref 98–111)
CO2: 21 MEQ/L (ref 23–33)
CREAT SERPL-MCNC: 0.9 MG/DL (ref 0.4–1.2)
DIFFERENTIAL TYPE: ABNORMAL
EOSINOPHIL # BLD: 0.9 %
EOSINOPHILS ABSOLUTE: 0.1 THOU/MM3 (ref 0–0.4)
ERYTHROCYTE [DISTWIDTH] IN BLOOD BY AUTOMATED COUNT: 15.6 % (ref 11.5–14.5)
ERYTHROCYTE [DISTWIDTH] IN BLOOD BY AUTOMATED COUNT: 46.3 FL (ref 35–45)
GFR SERPL CREATININE-BSD FRML MDRD: 82 ML/MIN/1.73M2
GLUCOSE BLD-MCNC: 103 MG/DL (ref 70–108)
GLUCOSE BLD-MCNC: 135 MG/DL (ref 70–108)
GLUCOSE BLD-MCNC: 143 MG/DL (ref 70–108)
GLUCOSE BLD-MCNC: 190 MG/DL (ref 70–108)
GLUCOSE BLD-MCNC: 97 MG/DL (ref 70–108)
HCT VFR BLD CALC: 34.9 % (ref 42–52)
HEMOGLOBIN: 10.7 GM/DL (ref 14–18)
IMMATURE GRANS (ABS): 0.66 THOU/MM3 (ref 0–0.07)
IMMATURE GRANULOCYTES: 7.6 %
LYMPHOCYTES # BLD: 15.2 %
LYMPHOCYTES ABSOLUTE: 1.3 THOU/MM3 (ref 1–4.8)
MCH RBC QN AUTO: 25.1 PG (ref 26–33)
MCHC RBC AUTO-ENTMCNC: 30.7 GM/DL (ref 32.2–35.5)
MCV RBC AUTO: 81.9 FL (ref 80–94)
MONOCYTES # BLD: 11.1 %
MONOCYTES ABSOLUTE: 1 THOU/MM3 (ref 0.4–1.3)
NUCLEATED RED BLOOD CELLS: 0 /100 WBC
PATHOLOGIST REVIEW: ABNORMAL
PLATELET # BLD: 197 THOU/MM3 (ref 130–400)
PLATELET ESTIMATE: ADEQUATE
PMV BLD AUTO: 9.5 FL (ref 9.4–12.4)
POTASSIUM SERPL-SCNC: 4 MEQ/L (ref 3.5–5.2)
RBC # BLD: 4.26 MILL/MM3 (ref 4.7–6.1)
SCAN OF BLOOD SMEAR: NORMAL
SEG NEUTROPHILS: 64.7 %
SEGMENTED NEUTROPHILS ABSOLUTE COUNT: 5.6 THOU/MM3 (ref 1.8–7.7)
SODIUM BLD-SCNC: 136 MEQ/L (ref 135–145)
TOTAL PROTEIN: 5.9 G/DL (ref 6.1–8)
WBC # BLD: 8.7 THOU/MM3 (ref 4.8–10.8)

## 2021-11-12 PROCEDURE — 36415 COLL VENOUS BLD VENIPUNCTURE: CPT

## 2021-11-12 PROCEDURE — 1200000003 HC TELEMETRY R&B

## 2021-11-12 PROCEDURE — 80053 COMPREHEN METABOLIC PANEL: CPT

## 2021-11-12 PROCEDURE — 92610 EVALUATE SWALLOWING FUNCTION: CPT

## 2021-11-12 PROCEDURE — 2580000003 HC RX 258: Performed by: HOSPITALIST

## 2021-11-12 PROCEDURE — 82948 REAGENT STRIP/BLOOD GLUCOSE: CPT

## 2021-11-12 PROCEDURE — 99232 SBSQ HOSP IP/OBS MODERATE 35: CPT | Performed by: SOCIAL WORKER

## 2021-11-12 PROCEDURE — 99232 SBSQ HOSP IP/OBS MODERATE 35: CPT | Performed by: FAMILY MEDICINE

## 2021-11-12 PROCEDURE — 99232 SBSQ HOSP IP/OBS MODERATE 35: CPT | Performed by: PHYSICAL MEDICINE & REHABILITATION

## 2021-11-12 PROCEDURE — 85025 COMPLETE CBC W/AUTO DIFF WBC: CPT

## 2021-11-12 PROCEDURE — 6370000000 HC RX 637 (ALT 250 FOR IP): Performed by: STUDENT IN AN ORGANIZED HEALTH CARE EDUCATION/TRAINING PROGRAM

## 2021-11-12 PROCEDURE — 97535 SELF CARE MNGMENT TRAINING: CPT

## 2021-11-12 PROCEDURE — 97110 THERAPEUTIC EXERCISES: CPT

## 2021-11-12 PROCEDURE — 92523 SPEECH SOUND LANG COMPREHEN: CPT

## 2021-11-12 PROCEDURE — 97530 THERAPEUTIC ACTIVITIES: CPT

## 2021-11-12 RX ORDER — CEFDINIR 300 MG/1
300 CAPSULE ORAL 2 TIMES DAILY
Status: DISCONTINUED | OUTPATIENT
Start: 2021-11-12 | End: 2021-11-13 | Stop reason: HOSPADM

## 2021-11-12 RX ADMIN — PANTOPRAZOLE SODIUM 40 MG: 40 TABLET, DELAYED RELEASE ORAL at 05:37

## 2021-11-12 RX ADMIN — SODIUM CHLORIDE, PRESERVATIVE FREE 10 ML: 5 INJECTION INTRAVENOUS at 09:51

## 2021-11-12 RX ADMIN — SODIUM CHLORIDE, PRESERVATIVE FREE 10 ML: 5 INJECTION INTRAVENOUS at 21:34

## 2021-11-12 RX ADMIN — DIVALPROEX SODIUM 500 MG: 500 TABLET, DELAYED RELEASE ORAL at 16:53

## 2021-11-12 RX ADMIN — ATORVASTATIN CALCIUM 10 MG: 10 TABLET, FILM COATED ORAL at 09:50

## 2021-11-12 RX ADMIN — CEFDINIR 300 MG: 300 CAPSULE ORAL at 09:49

## 2021-11-12 RX ADMIN — AZITHROMYCIN MONOHYDRATE 250 MG: 250 TABLET ORAL at 09:49

## 2021-11-12 RX ADMIN — AMLODIPINE BESYLATE 5 MG: 5 TABLET ORAL at 09:49

## 2021-11-12 RX ADMIN — OXYCODONE HYDROCHLORIDE AND ACETAMINOPHEN 500 MG: 500 TABLET ORAL at 09:49

## 2021-11-12 RX ADMIN — DIVALPROEX SODIUM 500 MG: 500 TABLET, DELAYED RELEASE ORAL at 09:49

## 2021-11-12 RX ADMIN — CETIRIZINE HYDROCHLORIDE 10 MG: 10 TABLET, FILM COATED ORAL at 09:50

## 2021-11-12 RX ADMIN — CEFDINIR 300 MG: 300 CAPSULE ORAL at 21:34

## 2021-11-12 ASSESSMENT — ENCOUNTER SYMPTOMS
PHOTOPHOBIA: 0
RHINORRHEA: 0
NAUSEA: 0
TROUBLE SWALLOWING: 0
COUGH: 0
SHORTNESS OF BREATH: 0
DIARRHEA: 0
WHEEZING: 0
COLOR CHANGE: 0
VOMITING: 0
BACK PAIN: 0
CONSTIPATION: 0

## 2021-11-12 NOTE — PROGRESS NOTES
Pharmacy Medication History Note      List of current medications patient is taking is complete. Source of information: Patient's wife, Epic fill history    Changes made to medication list:  Medications removed (include reason, ex. therapy complete or physician discontinued): · Blood glucose test strips -  prescription / List cleanup    Medications added/doses adjusted:  · N/A    Other notes (ex. Recent course of antibiotics, Coumadin dosing):  · Denies use of other OTC or herbal medications.       Allergies reviewed      Electronically signed by Jane Burger on 2021 at 12:49 PM

## 2021-11-12 NOTE — PROGRESS NOTES
Continue with amlodipine with hold parameters for BP management    NIDDM, controlled  Recent A1c on 2021 of 7.3%  Hold home metformin  Continue SSI  Monitor glucose    Normocytic anemia, JENIFFER  Little history available. Nnot present on CBC from 10/30/2019   B-12 and folate normal  Iron deficiency anemia workup  Continue oral iron + vitamin C  Consider ferrous sulfate every other day has same bioavailability as daily dosing with reduced risk of constipation. Bipolar disorder  CAD, HLD, GERD, OA, Chronic bilateral hearing loss  Continue home meds    Code Status: Full Code    Discharge plannin-2 days pending PT/OT evaluation and discharge planning, social work and case management consulted to aid with placement      Chief Complaint: Weakness, fall with head trauma    Hospital Course: Per admission H&P: Berto Liang is a 67 y.o. male with PMHx of CAD, hypertension, hyperlipidemia, GERD, bipolar disorder, recently admitted here from [facility] after being found confused at home. Patient was found to have acute respiratory failure secondary to bilateral pneumonia. Patient was found to be negative for Covid prior to transfer from [facility]. In the course of his hospitalization, patient received antibiotic management and demonstrated improvement. Patient was documented to be A+O x3 on 2021, and family members noted that patient's mental status was at baseline. Patient was therefore discharged home on 2021. However, it appears that patient sustained a fall upon return home. Patient reportedly hit his head although there was no loss of consciousness. Patient therefore was brought back to the ED. According to the ED notes, patient's wife realized that patient is too weak to ambulate and that she is unable to safely care for him at home. In the ED, CT of the head without contrast demonstrated no acute intracranial pathology. It did note comminuted fracture of the right nasal bone.   CT C-spine did not demonstrate any acute findings. Urinary analysis negative for any acute infection. Chest x-ray is negative for acute findings. \"    11/10: Mild hyponatremia is present. Pt is awake and alert; is only oriented to self. 11/11: Hyponatremia improving, mild hypokalemia present. Lactic is normal. Mental status has not worsened. Neurology consulted d/t concern for seizures. EEG: Cortical dysfunction see with encephalopathy. No definitive evidence of epileptiform activity appreciated    11/12/2021  Awaiting placement  Blood cultures from outside hospital reviewed. Organism #1 is sensitive to ceftriaxone. Organism #2 is sensitive to ampicillin. Pt did not come in with signs of worsened or new infection. Will continue current abx (azithromycin and cefdinir) and get two new blood cultures. Subjective: Pt seen and examined while he is sitting in bedside chair looking out the window. Patient did not speak much and is hard of hearing. States he has no complaints. Able to ambulate to bathroom without any difficulties. Denies CP, SOB, abd pain, nausea, vomiting, diarrhea, leg pain swelling cramping.        VW blood cultures and sensitivity    Medications:  Reviewed    Infusion Medications    dextrose       Scheduled Medications    ferrous sulfate  325 mg Oral Every Other Day    And    ascorbic acid  500 mg Oral Daily    sodium chloride flush  5-40 mL IntraVENous 2 times per day    [Held by provider] enoxaparin  40 mg SubCUTAneous Daily    insulin lispro  0-6 Units SubCUTAneous TID WC    insulin lispro  0-3 Units SubCUTAneous Nightly    azithromycin  250 mg Oral Daily    cefdinir  300 mg Oral BID    cetirizine  10 mg Oral Daily    divalproex  500 mg Oral BID    atorvastatin  10 mg Oral Daily    pantoprazole  40 mg Oral QAM AC    amLODIPine  5 mg Oral Daily     PRN Meds: potassium chloride **OR** potassium alternative oral replacement **OR** potassium chloride, sodium chloride flush, ondansetron **OR** ondansetron, polyethylene glycol, acetaminophen **OR** acetaminophen, hydrALAZINE, glucose, dextrose, glucagon (rDNA), dextrose, albuterol      Intake/Output Summary (Last 24 hours) at 11/12/2021 0734  Last data filed at 11/11/2021 1921  Gross per 24 hour   Intake 500 ml   Output --   Net 500 ml       Diet:  ADULT DIET; Regular; 4 carb choices (60 gm/meal)    Exam:  /62   Pulse 95   Temp 98.3 °F (36.8 °C) (Oral)   Resp 18   Ht 5' 11\" (1.803 m)   Wt 191 lb (86.6 kg)   SpO2 97%   BMI 26.64 kg/m²     General appearance: No apparent distress, appears stated age and cooperative. HEENT: Pupils equal, round, and reactive to light. Conjunctivae/corneas clear. Neck: Supple, with full range of motion. No jugular venous distention. Trachea midline. Respiratory:  Normal respiratory effort. Clear to auscultation, bilaterally without Rales/Wheezes/Rhonchi. Cardiovascular: Regular rate and rhythm with normal S1/S2 without murmurs, rubs or gallops. Abdomen: Soft, non-tender, non-distended with normal bowel sounds. Musculoskeletal: passive and active ROM x 4 extremities. Skin: Skin color, texture, turgor normal.  No rashes or lesions. Neurologic:  Neurovascularly intact without any focal sensory/motor deficits. Cranial nerves: II-XII intact, grossly non-focal.  Psychiatric: Alert and oriented to self and place, not to time  Capillary Refill: Brisk,< 3 seconds   Peripheral Pulses: +2 palpable, equal bilaterally       Labs:   Recent Labs     11/09/21 2013 11/11/21 0514   WBC 9.8 7.6   HGB 10.2* 10.1*   HCT 33.9* 33.1*    160     Recent Labs     11/09/21 2013 11/11/21 0514   * 133*   K 3.6 3.3*   CL 97* 97*   CO2 19* 22*   BUN 17 16   CREATININE 0.8 0.8   CALCIUM 8.9 8.7     Recent Labs     11/09/21 2013 11/11/21 0514   AST 44* 39   * 81*   BILIDIR  --  <0.2   BILITOT 0.4 0.4   ALKPHOS 91 85     No results for input(s): INR in the last 72 hours.   No results for input(s): Amy Hernandez in the last 72 hours. Urinalysis:      Lab Results   Component Value Date    NITRU NEGATIVE 11/09/2021    WBCUA 0-2 11/09/2021    BACTERIA NONE SEEN 11/09/2021    RBCUA 0-2 11/09/2021    BLOODU TRACE 11/09/2021    SPECGRAV 1.020 11/07/2016    GLUCOSEU 100 11/09/2021       Radiology:  MRI BRAIN W WO CONTRAST   Final Result       1. Mild atrophy and dilatation of the third and lateral ventricles. 2. Probable ischemic changes in the white matter. No evidence of an acute infarct. 3.. Mild inflammatory changes in the ethmoid air cells and mastoid air cells bilaterally. **This report has been created using voice recognition software. It may contain minor errors which are inherent in voice recognition technology. **         Final report electronically signed by DR Charmayne Ferdinand on 11/11/2021 4:29 PM      XR CHEST PORTABLE   Final Result   Stable radiographic appearance of the chest. No evidence of an acute process. **This report has been created using voice recognition software. It may contain minor errors which are inherent in voice recognition technology. **      Final report electronically signed by Dr. Jeremie Linares on 11/10/2021 3:25 PM      CT Head WO Contrast   Final Result   1. No acute intracranial pathology   2. Comminuted fracture right nasal bone. **This report has been created using voice recognition software. It may contain minor errors which are inherent in voice recognition technology. **      Final report electronically signed by Dr. Nelson Marquez on 11/9/2021 8:14 PM      CT Cervical Spine WO Contrast   Final Result   No acute process            **This report has been created using voice recognition software. It may contain minor errors which are inherent in voice recognition technology. **      Final report electronically signed by Dr. Nelson Marquez on 11/9/2021 8:45 PM      XR PELVIS (1-2 VIEWS)   Final Result   Negative exam            **This report has been

## 2021-11-12 NOTE — PROGRESS NOTES
[address] [place] [address]  SPEECH THERAPY  [facility] RENAL TELEMETRY 6K  Speech - Language - Cognitive Evaluation + Clinical Swallow Evaluation    SLP Individual Minutes  Time In: 1478  Time Out: 1110  Minutes: 42  Timed Code Treatment Minutes: 0 Minutes   Dkzhsx-Wpprasgt-Jvvqiebfk Evaluation: 27 minutes  Clinical Swallow Evaluation: 15 minutes    Date: 2021  Patient Name: Raya Moses      CSN: [id number]   : 1945  (67 y.o.)  Gender: male   Referring Physician:  Tyrell Justice MD  Diagnosis: Altered mental status  Secondary Diagnosis: dysphagia, cognitive deficits  Precautions: Fall risk, aspiration precautions  History of Present Illness/Injury: Patient [facility] with above medical dx. Per chart review, Marlene Porter is a 67 y.o. male with PMHx of CAD, hypertension, hyperlipidemia, GERD, bipolar disorder, recently admitted here from [facility] after being found confused at home. Patient was found to have acute respiratory failure secondary to bilateral pneumonia. Patient was found to be negative for Covid prior to transfer from [facility]. In the course of his hospitalization, patient received antibiotic management and demonstrated improvement. Patient was documented to be A+O x3 on 2021, and family members noted that patient's mental status was at baseline. Patient was therefore discharged home on 2021. \"    ST consulted to complete cognition and clinical swallow evaluation to establish POC.    : MRI BRAIN W WO CONTRAST:  1. Mild atrophy and dilatation of the third and lateral ventricles. 2. Probable ischemic changes in the white matter. No evidence of an acute infarct. 3.. Mild inflammatory changes in the ethmoid air cells and mastoid air cells bilaterally. 11/10: XR CHEST PORTABLE: Stable radiographic appearance of the chest. No evidence of an acute process.     Past Medical History:   Diagnosis Date    ASCVD (arteriosclerotic cardiovascular disease)  Bipolar 1 disorder (HCC)     GERD (gastroesophageal reflux disease)     Hyperlipidemia     Hypertension     Osteoarthritis     Type II or unspecified type diabetes mellitus without mention of complication, not stated as uncontrolled        Pain: No pain reported. Subjective:  RN Jermaine Wen with approval to proceed with evaluations. Upon arrival patient sitting in chair with no family present initially for clinical swallow evaluation, patient's daughter arrived at the beginning of the cognition evaluation and patient's wife and friends arrived at the end of the session. ST educated patient's guests about assessments completed and ST recommendation, verbal receptiveness noted. Patient's daughter and wife reported patient unable to read or write at baseline, however can do basic math.     SOCIAL HISTORY:   Living Arrangements: [place] with wife, 4 children live in close proximity  Work History: Retired  Education Level: 8th  Driving Status: Patient's daughter reported patient drives sometimes  Finance Management: Assistance Required  Medication Management: Assistance Required  ADL's: Independent  Vision Status: Glasses, however does not wear consistently  Hearing: Severely hearing impaired, patient reported having amplifier although patient's daughter reported does not use  Type of Home: House  Home Layout: Two level, Able to Live on Main level with bedroom/bathroom  Home Access: Stairs to enter with rails  Entrance Stairs - Number of Steps: 2 steps with 1 rail  Home Equipment: Cane, Rolling walker    SPEECH / VOICE:  Speech and Voice appear to be grossly intact for basic and complex daily communication    LANGUAGE:  Receptive:  1 Step Commands: 3/3  2 Step Commands: 3/3  Simple Yes/No Questions: 3/3  Complex Yes/No Questions: 2/3  Identify Objects/Pictures: 3/3    Expressive:  Automatic Speech: 1/3  Sentence Completion (automatic): 2/3  Divergent Naming (abstract): 1 item named in given 1 minute time frame    COGNITION:  [place] Cognitive Assessment (MOCA) version 7.3 completed. Pt scored /30. Normal is greater than or equal to 26/30. Inclusion of +1 point given highest level of education achieved less than/equal to 12th grade or GED with limited-0 post-secondary schooling   Orientation: 3/6  Immediate Recall: 4/5  Short-Term Recall: 0/5  Divergent Namin item named in given 1 minute time frame  Problem Solvin/3  Reasonin2  Sequencin/2  Thought Organization: [place] for basic communication  Insight: Adeuqate for basic communication  Attention: 1  Math Computation: 0/3  Executive Functionin/5    SWALLOWING:    Respiratory Status: Independent      Behavioral Observation: Alert    ORAL MECHANISM EVALUATION:      Facial / Labial Impaired Generalized weakness   Lingual Impaired Generalized weakness   Dentition Impaired Edentulous, does not wear dentures   Velum Not Tested    Vocal Quality WFL    Sensation Not Tested    Cough Not Tested      PATIENT WAS EVALUATED USING: Thin liquid via cup and straw, puree, soft solid, hard solid    ORAL PHASE:  Impaired:  Impaired Mastication    PHARYNGEAL PHASE:  Impaired: Audible Swallow    SIGNS AND SYMPTOMS OF LARYNGEAL PENETRATION / ASPIRATION:  No signs/symptoms of aspiration evident in this evaluation, but cannot rule out silent aspiration. INSTRUMENTAL EVALUATION: Instrumental evaluation not indicated at this time. DIET RECOMMENDATIONS:  Soft and bite sized diet+thin liquids    STRATEGIES: Full Upright Position, Small Bite/Sip, Supervision, Alternate Solids and Liquids, Limit Distractions and Monitor for Fatigue          RECOMMENDATIONS/ASSESSMENT:  DIAGNOSTIC IMPRESSIONS:  Patient reported hard textured foods take longer to chew.   Patient presents with mild oral dysphagia evidenced by prolonged mastication with hard texture trial.  Patient demonstrated what appeared to be adequate bolus containment, textural breakdown, cohesive bolus formation, and AP transit. Patient demonstrated audible swallows with thin liquids presentations, however patient with large drink sizes, ST with education r/t recommendation of small bites/sips. Audible swallows can be an indication for lack of bolus control; overt s/s of aspiration were noted with all trials consumed; certainly cannot r/o airway invasion events without formal instrumentation. ST recommends patient consume a soft and bite sized diet+thin liquids; ST to f/u with skilled dysphagia management to determine baseline swallow function. Patient presents with a moderate cognitive impairment evidenced by deficits in orientation, delayed recall, divergent naming, problem solving, math computation, executive functioning, and ST expects deficits in complex thought organization and insight. Receptive and expressive language appears grossly intact for basic communication; however unable to r/o deficits for complex tasks. Speech intelligibility approximates 100% in conversation. Patient previously living at home with wife completing some ADLS independently and requires assistance with IADLS. ST recommends patient receive skilled services to address deficits mentioned above to permit safe return to ADLS and driving. ST recommends patient be cleared by physician before returning to driving. Rehabilitation Potential: Good    EDUCATION:  Learner: Patient, Significant Other and Family  Education:  Reviewed results and recommendations of this evaluation, Reviewed ST goals and Plan of Care and Reviewed recommendations for follow-up  Evaluation of Education: Cristopher Longo understanding, Demonstrates with assistance and No evidence of learning    PLAN:  Skilled SLP intervention on acute care 3-5 x per week or until goals met and/or pt plateaus in function. Specific interventions for next session may include: dysphagia management, cognitive tasks. PATIENT GOAL:    Did not state.   Will further assess during treatment. SHORT TERM GOALS:  Short-term Goals  Timeframe for Short-term Goals: 2 weeks  Goal 1: Patient will consume a soft and bite sized diet+thin liquids with adequate viscous bolus cotainment, textural breakdown, and AP transit with no overt s/s of aspiration to contribute to nutrition/hydration. Goal 2: Patient will complete advanced PO trials (regular) with adequate endurance for a meal  mastication and textural breakdown and endurance for a meal to determine readiness for dietary upgrade. Goal 3: Patient will complete functional carryover and attention tasks (orientation, biographical, staff relations, call light 3 units) with 60% accuracy mod cues to contribute to awareness fo surroundings and permit potential return to driving. Goal 4: Patient will complete immediate, delayed and working memory tasks with 60% accuracy mod cues to improve retention of pertinent information. Goal 5: Patient will complete basic thought organization (divergent naming) and exectutive functioning tasks with 60% accuracy mod cues to contribute to IADLS. Goal 6: Patient will complete problem solving, reasoning, and sequencing tasks with 60% accuracy mod cues to contribute to ADLS. LONG TERM GOALS:  No LTG established due to short ELOS.     EMELIA Chatman., Student Intern  Minnie Umaña M.A., [address] [place] [address]

## 2021-11-12 NOTE — CARE COORDINATION
11/12/21, 7:12 AM EST    DISCHARGE PLANNING EVALUATION      Message from [facility], they are waiting plans for positive blood culture before giving determination on acceptance    9:55 AM  NANCY spoke with pt's wife this morning on the phone. Wife reports [facility] would actually be their first choice, she reports she does not drive in [address] and [place] would be closer and reports she can drive there. Wife also reports she wanting thinking family would transport, but reports would be better for us to set up the transport. SW did let her know this would likely be an extra cost, wife okay with this. Wife reports she will be up today to visit with pt, reports she has a ride coming to pick her up. 11:26 AM  NANCY did call Melani with [facility], let them know pt to continue with current oral antibiotics he is currently on at discharge. She will get back with SW on determination. 12:31 PM  Call from [address] [place] [address] with [facility], they can accept, but not until Tuesday due to pt's vaccination status, NANCY did confirm with wife that pt is COVID unvaccinated. 2:03 PM  Call from [place] [facility], they can accept today, NANCY did notify nurse on this.

## 2021-11-12 NOTE — PROGRESS NOTES
Consult received, chart review, await PM&R recommendations for further determination of patient needs.

## 2021-11-12 NOTE — PROGRESS NOTES
[address] RENAL TELEMETRY 6K  Occupational Therapy  Daily Note  Time:   Time In: 3177  Time Out: 4153  Timed Code Treatment Minutes: 23 Minutes  Minutes: 23          Date: 2021  Patient Name: Neida Peters,   Gender: male      Room: [facility]1983005[facility]  MRN: [id number]  : 1945  (67 y.o.)  Referring Practitioner: Angelo Page MD  Diagnosis: Altered mental status  Additional Pertinent Hx: Leo Ashford is a 67 y.o. male with PMHx of CAD, hypertension, hyperlipidemia, GERD, bipolar disorder, recently admitted here from [facility] after being found confused at home. Patient was found to have acute respiratory failure secondary to bilateral pneumonia. Patient was found to be negative for Covid prior to transfer from [facility]. In the course of his hospitalization, patient received antibiotic management and demonstrated improvement. Patient was documented to be A+O x3 on 2021, and family members noted that patient's mental status was at baseline. Patient was therefore discharged home on 2021. However, it appears that patient sustained a fall upon return home. Patient reportedly hit his head although there was no loss of consciousness. Patient therefore was brought back to the ED. According to the ED notes, patient's wife realized that patient is too weak to ambulate and that she is unable to safely care for him at home. In the ED, CT of the head without contrast demonstrated no acute intracranial pathology. It did note comminuted fracture of the right nasal bone. CT C-spine did not demonstrate any acute findings. Urinary analysis negative for any acute infection. Chest x-ray is negative for acute findings. Restrictions/Precautions:  Restrictions/Precautions: Fall Risk, General Precautions     SUBJECTIVE: RN approved of OT session. Pt supine in bed on arrival and agreeable to OT. Oriented x2/3, oriented to year, day of week but unable to identify month .     PAIN: denies Vitals: Vitals not assessed per clinical judgement, see nursing flowsheet    COGNITION: Slow Processing, Decreased Insight, Decreased Problem Solving and Decreased Safety Awareness    ADL:   Grooming: Stand By Assistance.  hand hygiene standing at sink   Lower Extremity Dressing: Minimal Assistance. don new depends - A to thread depends through ankles, 2 verbal prompts provided to complete LB tasks   Toileting: Minimal Assistance. for clothing management and CGA for pericare with requiring 3 verbal prompts to perform negro care   Toilet Transfer: Contact Guard Assistance, X 1, with verbal cues  and with increased time for completion. Kierra Carrasquillo BALANCE:  Sitting Balance:  Supervision. on toilet  Standing Balance: contact guard assistance x1 with 1 UE release to perform ADLs for x3 minutes    BED MOBILITY:  Supine to Sit: Stand By Assistance, X 1, with rail, with verbal cues , with increased time for completion      TRANSFERS:  Sit to Stand:  Contact Guard Assistance, X 1, with increased time for completion, cues for hand placement. Stand to Sit: Contact Guard Assistance, X 1, with increased time for completion, cues for hand placement. FUNCTIONAL MOBILITY:  Assistive Device: Rolling Walker  Assist Level:  Contact Guard Assistance, X 1 and with increased time for completion. Distance: To and from bathroom  Min vc's for safety, slow pace, no LOB      ADDITIONAL ACTIVITIES:  Patient completed BUE strengthening exercises with skilled education on HEP: completed x15 reps x1 set with a mod resistive band  in all joints and all planes in order to improve UE strength and activity tolerance required for BADL routine and toilet / shower transfers. Patient tolerated well, requiring 0 rest breaks. Patient also required mod cues for technique. ASSESSMENT:     Activity Tolerance:  Patient tolerance of  treatment: good.        Discharge Recommendations: Subacute/skilled nursing facility and Patient would benefit from continued OT at discharge  Equipment Recommendations: Equipment Needed: No  Plan: Times per week: 5x  Times per day: Daily  Current Treatment Recommendations: Self-Care / ADL, Functional Mobility Training, Endurance Training, Strengthening    Patient Education  Patient Education: Role of OT, Plan of Care, ADL's, Home Exercise Program, Home Safety and Importance of Increasing Activity    Goals  Short term goals  Time Frame for Short term goals: By discharge  Short term goal 1: Pt will complete functional mobility to/from , progressing to [facility] with SBA and good safety awareness to access ADLs  Short term goal 2: Patient to complete dynamic standing task x5 minutes with at least 1 UE release and SBA to increase indep with toileting tasks  Short term goal 3: Patient to complete BADL routine with SBA and requiring no > than 1 verbal command to increase indep ADL completion. Short term goal 4: Patient to complete BUE mod resistive HEP with min cues for technique to increase strength and endurance needed for all mobility and ADL tasks. Short term goal 5: pt will be oriented x3 with no more than 1 verbal prompt to increase alertness in completeing ADLs    Following session, patient left in safe position with all fall risk precautions in place.

## 2021-11-12 NOTE — PROGRESS NOTES
rail  Home Equipment: Cane, Rolling walker   Bathroom Shower/Tub: Walk-in shower, Shower chair with back  Bathroom Toilet: Standard  Bathroom Equipment: Grab bars in [address] Help From: Family  ADL Assistance: [address]: Needs assistance  Homemaking Responsibilities: Yes  Ambulation Assistance: Independent  Transfer Assistance: Independent  Active : No  Additional Comments: Pt reports that he did not use AD in PLOF generally, but sometimes used a cane to walk to the mailbox or when walking into the kitchen. Restrictions/Precautions:  Restrictions/Precautions: Fall Risk, General Precautions     SUBJECTIVE: RN approved session. Patient resting in bed upon arrival, family present in room to observe session. Patient easily awaken and required little encouragement to participate with therapy. Patient declined transfer to chair at end of session and requested to return to bed. Provided family education on therapy role at SNF and POC. PAIN: not rated    Vitals: Vitals not assessed per clinical judgement, see nursing flowsheet    OBJECTIVE:  Bed Mobility:  Rolling to Right: Stand By Assistance, with head of bed flat, with rail, with verbal cues , with increased time for completion   Supine to Sit: Contact Guard Assistance, with head of bed flat, with rail, with verbal cues , with increased time for completion  Sit to Supine: Stand By Assistance, with head of bed flat, with increased time for completion   Scooting: Contact Guard Assistance to edge of bed    Transfers:  Sit to Stand: Contact Guard Assistance, Minimal Assistance, X 1, with increased time for completion, cues for hand placement, with verbal cues  Stand to Sit:Stand By Assistance, Contact Guard Assistance, X 1, impulsive to sit on bed    Ambulation:  Contact Guard Assistance  Distance: 8ft in room  Surface: Level Tile  Device:Rolling Walker  Gait Deviations:   Forward Flexed Posture, Slow Gaby, Decreased Step Length Bilaterally, Decreased Gait Speed, Decreased Heel Strike Bilaterally, Mild Path Deviations, Unsteady Gait and Decreased Terminal Knee Extension    **educated on use of RW for safety    Balance:  Static Standing Balance: Contact Guard Assistance    Exercise:  Patient was guided in 1 set(s) 10 reps of exercise to both lower extremities. Heelslides, Hip abduction/adduction and Straight leg raises. Exercises were completed for increased independence with functional mobility. Functional Outcome Measures: Completed  AM-PAC Inpatient Mobility without Stair Climbing Raw Score : 15  AM-PAC Inpatient without Stair Climbing T-Scale Score : 43.03    ASSESSMENT:  Assessment: Patient progressing toward established goals. and Requires encouragement. Activity Tolerance:  Patient tolerance of  treatment: fair. Equipment Recommendations:Equipment Needed: No  Discharge Recommendations: Subacte/Skilled Nursing Facility  Plan: Times per week: 3-5X GM  Times per day: Daily  Specific instructions for Next Treatment: therex and mobility, balance activities    Patient Education  Patient Education: Plan of Care, Family Education, Bed Mobility, Transfers, Gait, Up in Chair for All Meals, Verbal Exercise Instruction    Goals:  Patient goals : feel better  Short term goals  Time Frame for Short term goals: by discharge  Short term goal 1: Pt to be SBA for supine <> sit to get in/out of bed  Short term goal 2: Pt to be SBA for sit <> stand to get up to ambulate  Short term goal 3: Pt to ambulate 50 ft with LRAD with SBA to walk safely in room  Long term goals  Time Frame for Long term goals : not set due to short ELOS    Following session, patient left in safe position with all fall risk precautions in place.

## 2021-11-12 NOTE — PROGRESS NOTES
Neurology Progress Note    Date:11/12/2021       Room:[address] [place] [address]  Patient Kelsi Helm     YOB: 1945     Age:76 y.o. Requesting Physician: Syeda Roth MD     Reason for Consult:  Evaluate for Seizure, Encephalopathy      Chief Complaint: Altered Mental Status    Subjective     Quang Beltran is a 66 yo M who presented to [address] [place] [address] ED after a mechanical fall in the bathroom at home at 4pm on Tuesday 11/09/21. Pt had been discharged from [address] [place] [address] earlier that day from admission for B PNA and encephalopathy. Per pts wife, pt had improved mentation but not back to baseline at discharge. She reports intermittent AMS. On this admission pts wife reports that pt had behavior change for weeks before original admission/PNA diagnosis consisting of fidgeting, jumping up and down and staring into space. These are characterized as wringing hand movements and rubbing legs while seated, moving from seated to standing position frequently and without purpose and while seated and not stimulated he would stare into space or at the clock. These staring episodes did no occur while in conversation and he would remain responsive while staring. Interval History 11/12/21:  Oriented to self, day of the month, city and state and in the hospital. States that he is in [facility], it is spring of '84. Review of Systems   Review of Systems   Constitutional: Negative for activity change, fatigue and fever. HENT: Positive for hearing loss. Negative for tinnitus and trouble swallowing. Eyes: Negative for photophobia and visual disturbance. Respiratory: Negative for cough and shortness of breath. Cardiovascular: Negative for chest pain and palpitations. Gastrointestinal: Negative for diarrhea, nausea and vomiting. Genitourinary: Negative for dysuria and flank pain. Musculoskeletal: Positive for gait problem (falls). Negative for neck pain and neck stiffness. Skin: Negative for color change and rash. Neurological: Negative for dizziness, facial asymmetry, speech difficulty, weakness, numbness and headaches. Psychiatric/Behavioral: Negative for agitation and behavioral problems. Medications   Scheduled Meds:    ferrous sulfate  325 mg Oral Every Other Day    And    ascorbic acid  500 mg Oral Daily    sodium chloride flush  5-40 mL IntraVENous 2 times per day    [Held by provider] enoxaparin  40 mg SubCUTAneous Daily    insulin lispro  0-6 Units SubCUTAneous TID WC    insulin lispro  0-3 Units SubCUTAneous Nightly    azithromycin  250 mg Oral Daily    cefdinir  300 mg Oral BID    cetirizine  10 mg Oral Daily    divalproex  500 mg Oral BID    atorvastatin  10 mg Oral Daily    pantoprazole  40 mg Oral QAM AC    amLODIPine  5 mg Oral Daily     Continuous Infusions:    dextrose       PRN Meds: potassium chloride **OR** potassium alternative oral replacement **OR** potassium chloride, sodium chloride flush, ondansetron **OR** ondansetron, polyethylene glycol, acetaminophen **OR** acetaminophen, hydrALAZINE, glucose, dextrose, glucagon (rDNA), dextrose, albuterol    Past History    Past Medical History:   has a past medical history of ASCVD (arteriosclerotic cardiovascular disease), Bipolar 1 disorder ([facility] [address] GERD (gastroesophageal reflux disease), Hyperlipidemia, Hypertension, Osteoarthritis, and Type II or unspecified type diabetes mellitus without mention of complication, not stated as uncontrolled. Social History:   reports that he has been smoking cigarettes. He started smoking about 37 years ago. He has been smoking about 0.50 packs per day. He has never used smokeless tobacco. He reports that he does not drink alcohol and does not use drugs. Family History: Pt has 2 sisters with seizure disorder per pt wife. No other family history able ot be obtained.      Physical Examination      Vitals:  BP (!) 148/75   Pulse 96   Temp 98 °F (36.7 °C) (Axillary)   Resp 18   Ht 5' 11\" (1.803 m) Wt 191 lb (86.6 kg)   SpO2 94%   BMI 26.64 kg/m²   Temp (24hrs), Av °F (36.7 °C), Min:97.3 °F (36.3 °C), Max:98.8 °F (37.1 °C)      I/O (24Hr): Intake/Output Summary (Last 24 hours) at 2021 1018  Last data filed at 2021 1921  Gross per 24 hour   Intake 380 ml   Output --   Net 380 ml       Physical Exam  Vitals reviewed. Constitutional:       Appearance: Normal appearance. HENT:      Right Ear: External ear normal.      Left Ear: External ear normal.      Mouth/Throat:      Mouth: Mucous membranes are moist.      Pharynx: Oropharynx is clear. Eyes:      Extraocular Movements: Extraocular movements intact and EOM normal.      Pupils: Pupils are equal, round, and reactive to light. Pulmonary:      Effort: Pulmonary effort is normal. No respiratory distress. Abdominal:      Palpations: Abdomen is soft. Musculoskeletal:      Cervical back: No rigidity or tenderness. Skin:     General: Skin is warm and dry. Neurological:      Mental Status: He is alert. Coordination: Finger-Nose-Finger Test, Heel to [employer] and Romberg Test normal.   Psychiatric:         Mood and Affect: Mood normal.         Speech: Speech normal.         Behavior: Behavior normal.         Thought Content: Thought content normal.       Neurologic Exam     Mental Status   Follows 1 step commands. Attention: decreased. Speech: speech is normal   Level of consciousness: alert  Able to name object. Oriented to self, day of the month, city and state and in the hospital. States that he is in [facility], it is spring of '84. Cranial Nerves     CN II   Visual fields full to confrontation. CN III, IV, VI   Pupils are equal, round, and reactive to light. Extraocular motions are normal.     CN V   Facial sensation intact. CN VII   Facial expression full, symmetric.      CN VIII   Hearing: impaired    CN IX, X   Palate: symmetric    CN XI   Right sternocleidomastoid strength: normal  Right trapezius strength: normal    CN XII   CN XII normal.     Motor Exam   Muscle bulk: normal  Overall muscle tone: normal  Strength 4/5 throughout     Sensory Exam   Light touch normal.     Gait, Coordination, and Reflexes     Coordination   Romberg: negative  Finger to nose coordination: normal  Heel to shin coordination: normal     Labs/Imaging/Diagnostics   Labs:  CBC:  Recent Labs     11/09/21 2013 11/11/21 0514   WBC 9.8 7.6   RBC 4.00* 4.14*   HGB 10.2* 10.1*   HCT 33.9* 33.1*   MCV 84.8 80.0    160     CHEMISTRIES:  Recent Labs     11/09/21 2013 11/11/21 0514   * 133*   K 3.6 3.3*   CL 97* 97*   CO2 19* 22*   BUN 17 16   CREATININE 0.8 0.8   GLUCOSE 124* 132*   MG  --  1.9     PT/INR:No results for input(s): PROTIME, INR in the last 72 hours. APTT:No results for input(s): APTT in the last 72 hours. LIVER PROFILE:  Recent Labs     11/09/21 2013 11/11/21 0514   AST 44* 39   * 81*   BILIDIR  --  <0.2   BILITOT 0.4 0.4   ALKPHOS 91 85       Imaging Last 24 Hours:  XR PELVIS (1-2 VIEWS)    Result Date: 11/9/2021  PROCEDURE: XR PELVIS (1-2 VIEWS) CLINICAL INFORMATION: Fall . TECHNIQUE: Single AP pelvis COMPARISON: No prior study. FINDINGS: No fracture or bone destruction. SI joints are intact. Hip joints are intact. No soft tissue abnormality seen. Negative exam **This report has been created using voice recognition software. It may contain minor errors which are inherent in voice recognition technology. ** Final report electronically signed by Dr. Shala Myles on 11/9/2021 8:11 PM    CT Head WO Contrast    Result Date: 11/9/2021  PROCEDURE: CT HEAD WO CONTRAST CLINICAL INFORMATION: Head injury . TECHNIQUE: 2-D multiplanar noncontrast CT brain All CT scans at this facility use dose modulation, iterative reconstruction, and/or weight-based dosing when appropriate to reduce radiation dose to as low as reasonably achievable. COMPARISON: No prior study.  FINDINGS: Generalized moderate cerebral volume loss. Gray-white differentiation is maintained but there is mild periventricular white matter diminished attenuation. Ventricles are prominent but consistent with the degree of volume loss. There is no hemorrhage or extra-axial fluid collection. There is no evidence of acute infarction. There is a 4 fracture of the right nasal bone this is slightly displaced. Visualized paranasal sinuses and mastoid air cells are clear the exception of the round soft tissue density in the posterior ethmoid sinus chamber on the left possibly a mucous retention cyst.     1. No acute intracranial pathology 2. Comminuted fracture right nasal bone. **This report has been created using voice recognition software. It may contain minor errors which are inherent in voice recognition technology. ** Final report electronically signed by Dr. Arun Reid on 11/9/2021 8:14 PM    CT Cervical Spine WO Contrast    Result Date: 11/9/2021  PROCEDURE: CT CERVICAL SPINE WO CONTRAST CLINICAL INFORMATION: Injury . TECHNIQUE: 2-D multiplanar noncontrast images of the cervical spine bone windows only All CT scans at this facility use dose modulation, iterative reconstruction, and/or weight-based dosing when appropriate to reduce radiation dose to as low as reasonably achievable. COMPARISON: No prior study. FINDINGS: There is straightening of the normal lordosis. There is no acute fracture or acute bony malalignment. Degenerative changes throughout the entire cervical spine. Neural foraminal narrowing at C4-5 and C5-6. No precervical soft tissue swelling. No acute process **This report has been created using voice recognition software. It may contain minor errors which are inherent in voice recognition technology. ** Final report electronically signed by Dr. Arun Reid on 11/9/2021 8:45 PM    XR CHEST PORTABLE    Result Date: 11/10/2021  PROCEDURE: XR CHEST PORTABLE CLINICAL INFORMATION: recent pna. fell, weakness COMPARISON: Chest x-ray 12/11/2012. . TECHNIQUE: AP upright view of the chest. FINDINGS: The heart size is normal.The mediastinum is not widened. There are no pulmonary infiltrates or effusions. The pulmonary vascularity is normal.No suspicious osseous lesions are present. Stable radiographic appearance of the chest. No evidence of an acute process. **This report has been created using voice recognition software. It may contain minor errors which are inherent in voice recognition technology. ** Final report electronically signed by Dr. Racheal Fair on 11/10/2021 3:25 PM        Assessment and Plan:        Hospital Problems           Last Modified POA    Altered mental status 11/10/2021 Yes    Closed fracture of nasal bones 11/11/2021 Yes    Acute metabolic encephalopathy 76/40/2133 Yes    Accident due to mechanical fall without injury 11/11/2021 Yes    Physical deconditioning 11/11/2021 Yes    Gram-negative bacteremia 11/11/2021 Yes          1. Altered Mental Status- suspect TME due to infection  · CTH on 11/9/21- no acute bleed, nasal fracture  · EEG- theta slowing consistent with encephalopathy, no epileptiform activity  · MRI- mild atrophy with ex vacuo ventriculomegaly, likely chronic ischemic changes, no acute infarct  · Continue home dose ASA 81mg daily  · CXR 11/10/21- no acute process  · Continues Azithromycin + Cefdinir  · Covid Negative, Flu Negative  · +Blood cx from [place], E coli and Enterococcus on 11/7/21  · Tx and new Bcx per primary  · B12, Folate Normal  · TSH elevated at 5.820, T4 normal at 0.88  · Thiamine pending  · Ammonia normal at 35  · Depakote level therapeutic at 83.5  · Monitor and correct electrolyte derangement  · Recommend outpatient neurology follow up for continuity of care and dementia work up per family concern. · No further work up or recommendations. Neurology will sign off. This patient was discussed with Dr. Yadira Ramirez and he is in agreement with the assessment and plan.          Electronically signed by Wisam Stratton PA-C on 11/12/21 at 11:36 AM EST

## 2021-11-12 NOTE — PROGRESS NOTES
Physical Medicine & Rehabilitation Progress Note    Chief Complaint:  tired    Subjective:    Brady Nelson is a 67 y.o. right-handed  male with history of hypertension, hyperlipidemia, GERD, bipolar disorder, coronary artery disease, diabetes mellitus type 2, impaired hearing, status post abdominal surgery, left foot fracture requiring surgical intervention, was admitted to [address] [place] [address] on 11/9/2021 after he fell at home and hit his head. The patient is a poor historian.      The patient was found on the floor with confusion around 1-2 AM on 11/7/2021. He was sent to [facility]. He was discovered having bilateral lower lobe pneumonia with O2 sat at 89% in room air. Covid test was negative. He then was transferred to [address] [place] [address] for acute respiratory failure secondary to bilateral pneumonia with confusion and delirium. He was treated and his condition improved. He then was discharged on 11/9/2021. At the time of his discharged, the patient was oriented x 3. However he had a fall accident at home hitting his head soon after he returned home on 11/9/2021. He did not lose consciousness from the fall. He was then sent back to ER on the same day, 11/9/2021. CT of head revealed only right nasal bone comminuted fracture. He was found to be hyponatremic with sodium level of 131. Patient was also found to be confused and disoriented. Therefore he was hospitalized. The [facility] call on 11/11/2021 informing that the blood culture done on 11/7/2021 at [facility] revealed E. coli and Enterococcus gallinarum. Blood cultures was repeated & result pending. Azithromycin and cefdinir were continued. MRI of brain with and without contrast was done on 11/11/2021 and shows mild atrophy and dilatation of the third and lateral ventricles, and no evidence of acute infarction. The patient was sleeping when I visited him today.   He expressed that he was tired after the PT, OT & speech therapy. He remains confused and oriented only to place but not year. She still has impaired hearing acuity and often answering questioning inappropriately. Rehabilitation:  PT: Reviewed. Timed Code Treatment Minutes: 28 Minutes  Activity Tolerance:  Patient tolerance of  treatment: fair. SUBJECTIVE: RN approved session. Patient resting in bed upon arrival, family present in room to observe session. Patient easily awaken and required little encouragement to participate with therapy. Patient declined transfer to chair at end of session and requested to return to bed. Provided family education on therapy role at SNF and POC. Bed Mobility:  Rolling to Right: Stand By Assistance, with head of bed flat, with rail, with verbal cues , with increased time for completion   Supine to Sit: Contact Guard Assistance, with head of bed flat, with rail, with verbal cues , with increased time for completion  Sit to Supine: Stand By Assistance, with head of bed flat, with increased time for completion   Scooting: Contact Guard Assistance to edge of bed     Transfers:  Sit to Stand: Contact Guard Assistance, Minimal Assistance, X 1, with increased time for completion, cues for hand placement, with verbal cues  Stand to Sit:Stand By Assistance, Contact Guard Assistance, X 1, impulsive to sit on bed     Ambulation:  Contact Guard Assistance  Distance: 8ft in room  Surface: Level Tile  Device:Rolling Walker  Gait Deviations: Forward Flexed Posture, Slow Gaby, Decreased Step Length Bilaterally, Decreased Gait Speed, Decreased Heel Strike Bilaterally, Mild Path Deviations, Unsteady Gait and Decreased Terminal Knee Extension     **educated on use of RW for safety     Balance:  Static Standing Balance: Contact Guard Assistance      OT: Reviewed. Timed Code Treatment Minutes: 23 Minutes  Activity Tolerance:  Patient tolerance of  treatment: good. SUBJECTIVE: RN approved of OT session.  Pt supine in bed on arrival and agreeable to OT. Oriented x2/3, oriented to year, day of week but unable to identify month     COGNITION: Slow Processing, Decreased Insight, Decreased Problem Solving and Decreased Safety Awareness     ADL:   Grooming: Stand By Assistance.  hand hygiene standing at sink   Lower Extremity Dressing: Minimal Assistance. don new depends - A to thread depends through ankles, 2 verbal prompts provided to complete LB tasks   Toileting: Minimal Assistance. for clothing management and CGA for pericare with requiring 3 verbal prompts to perform negro care   Toilet Transfer: Contact Guard Assistance, X 1, with verbal cues  and with increased time for completion. .     BALANCE:  Sitting Balance:  Supervision. on toilet  Standing Balance: contact guard assistance x1 with 1 UE release to perform ADLs for x3 minutes     BED MOBILITY:  Supine to Sit: Stand By Assistance, X 1, with rail, with verbal cues , with increased time for completion       TRANSFERS:  Sit to Stand:  Contact Guard Assistance, X 1, with increased time for completion, cues for hand placement. Stand to Sit: Contact Guard Assistance, X 1, with increased time for completion, cues for hand placement.       FUNCTIONAL MOBILITY:  Assistive Device: Rolling Walker  Assist Level:  Contact Guard Assistance, X 1 and with increased time for completion. Distance: To and from bathroom  Min vc's for safety, slow pace, no LOB       ADDITIONAL ACTIVITIES:  Patient completed BUE strengthening exercises with skilled education on HEP: completed x15 reps x1 set with a mod resistive band  in all joints and all planes in order to improve UE strength and activity tolerance required for BADL routine and toilet / shower transfers. Patient tolerated well, requiring 0 rest breaks. Patient also required mod cues for technique. ST: Reviewed.     Oytaxm-Nkrbqiiz-Ayjafsfgy Evaluation: 27 minutes  Clinical Swallow Evaluation: 15 minutes    COGNITION:  Verizon Cognitive Assessment (MOCA) version 7.3 completed. Pt scored 12/30. Normal is greater than or equal to 26/30. DIAGNOSTIC IMPRESSIONS:  Patient reported hard textured foods take longer to chew. Patient presents with mild oral dysphagia evidenced by prolonged mastication with hard texture trial.  Patient demonstrated what appeared to be adequate bolus containment, textural breakdown, cohesive bolus formation, and AP transit. Patient demonstrated audible swallows with thin liquids presentations, however patient with large drink sizes, ST with education r/t recommendation of small bites/sips. Audible swallows can be an indication for lack of bolus control; overt s/s of aspiration were noted with all trials consumed; certainly cannot r/o airway invasion events without formal instrumentation. ST recommends patient consume a soft and bite sized diet+thin liquids; ST to f/u with skilled dysphagia management to determine baseline swallow function.     Patient presents with a moderate cognitive impairment evidenced by deficits in orientation, delayed recall, divergent naming, problem solving, math computation, executive functioning, and ST expects deficits in complex thought organization and insight. Receptive and expressive language appears grossly intact for basic communication; however unable to r/o deficits for complex tasks. Speech intelligibility approximates 100% in conversation. Patient previously living at home with wife completing some ADLS independently and requires assistance with IADLS. ST recommends patient receive skilled services to address deficits mentioned above to permit safe return to ADLS and driving. ST recommends patient be cleared by physician before returning to driving. Review of Systems:  Review of Systems   Unable to perform ROS: Mental status change   Constitutional: Positive for fatigue. Negative for chills and fever. HENT: Positive for hearing loss.  Negative for rhinorrhea and sneezing. Eyes: Negative for visual disturbance. Respiratory: Negative for cough, shortness of breath and wheezing. Cardiovascular: Negative for leg swelling. Gastrointestinal: Negative for constipation, diarrhea, nausea and vomiting. Musculoskeletal: Positive for gait problem. Negative for arthralgias, back pain, myalgias and neck pain. Neurological: Positive for weakness. Negative for dizziness, speech difficulty, light-headedness, numbness and headaches. Psychiatric/Behavioral: Positive for confusion. Negative for dysphoric mood and hallucinations. The patient is not nervous/anxious.          Objective:  /67   Pulse 85   Temp 98.4 °F (36.9 °C) (Axillary)   Resp 18   Ht 5' 11\" (1.803 m)   Wt 191 lb (86.6 kg)   SpO2 95%   BMI 26.64 kg/m²   Physical Exam   General:  well-developed, well nourished  male; in no acute distress ; slightly flat affect & mood; impaired hearing acuity; answering questions inappropriately from time to time   Eyes: pupil equally round ; extra-ocular motion intact bilaterally  Head, Ear, Nose, Mouth & Throat : normocephalic ; no discharge from ears or nose ; no deformity ; no facial swelling ; oral mucosa pink   Cardiovascular : regular rate & rhythm ; normal S1 & S2 heart sound ; no murmur ; normal peripheral pulse at bilateral upper extremities; slightly reduced pulse strength at the bilateral lower extremities  Pulmonary : lung clear to auscultation ; no wheezing ; no rale ; no crackle  Gastrointestinal : soft, slightly protruded abdomen without tenderness ; normal bowel sound present   Skin: no skin lesion or rash ; no pitting edema at all 4 extremities  Musculoskeletal : no limb asymmetry; no limb deformity; no tenderness at bilateral upper & lower extremities; no palpable mass at limbs ; no joints laxity or crepitation ; shoulder flexion and abduction passive ROM reaching 140 degrees bilaterally; hip flexion passive ROM reaching 110 degrees bilaterally; ankle dorsiflexion passive ROM reaching 5 degrees bilaterally; otherwise normal functional joints ROM at the rest of bilateral upper & lower extremities  Cerebral :  alert ; awake ; oriented to place and himself; not oriented to year or month ; follow one-step verbal or gesture command most of the time but not consistently  Sensory : intact light touch and pin prick sensation at bilateral upper & lower extremities  Motor : normal tone at bilateral upper & lower extremities ; 4+/5 muscle strength at bilateral finger extension and handgrip; 4-/5 muscle strength at the bilateral finger abduction; 4+/5 muscle strength at the bilateral hip flexion; otherwise normal 5/5 muscle strength at the rest of bilateral upper & lower extremities  Reflex : 1+ bilateral knees reflexes; 0 bilateral biceps and bilateral brachioradialis reflexes  Pathological Reflex :  No Yessi's sign ; no ankle clonus  Gait : Not assessed      Diagnostics:   Recent Results (from the past 24 hour(s))   POCT Glucose    Collection Time: 11/11/21  4:43 PM   Result Value Ref Range    POC Glucose 109 (H) 70 - 108 mg/dl   Culture, Blood 1    Collection Time: 11/11/21  4:54 PM    Specimen: Blood   Result Value Ref Range    Blood Culture, Routine No growth-preliminary     POCT Glucose    Collection Time: 11/11/21  8:39 PM   Result Value Ref Range    POC Glucose 147 (H) 70 - 108 mg/dl   POCT Glucose    Collection Time: 11/12/21  6:53 AM   Result Value Ref Range    POC Glucose 190 (H) 70 - 108 mg/dl   CBC Auto Differential    Collection Time: 11/12/21 10:09 AM   Result Value Ref Range    WBC 8.7 4.8 - 10.8 thou/mm3    RBC 4.26 (L) 4.70 - 6.10 mill/mm3    Hemoglobin 10.7 (L) 14.0 - 18.0 gm/dl    Hematocrit 34.9 (L) 42.0 - 52.0 %    MCV 81.9 80.0 - 94.0 fL    MCH 25.1 (L) 26.0 - 33.0 pg    MCHC 30.7 (L) 32.2 - 35.5 gm/dl    RDW-CV 15.6 (H) 11.5 - 14.5 %    RDW-SD 46.3 (H) 35.0 - 45.0 fL    Platelets 448 611 - 479 thou/mm3    MPV 9.5 9.4 - 12.4 fL Pathologist Review ROBERT CURRIE Differential Type see below     Seg Neutrophils 64.7 %    Lymphocytes 15.2 %    Monocytes 11.1 %    Eosinophils 0.9 %    Basophils 0.5 %    Immature Granulocytes 7.6 %    Platelet Estimate ADEQUATE Adequate    Segs Absolute 5.6 1.8 - 7.7 thou/mm3    Lymphocytes Absolute 1.3 1.0 - 4.8 thou/mm3    Monocytes Absolute 1.0 0.4 - 1.3 thou/mm3    Eosinophils Absolute 0.1 0.0 - 0.4 thou/mm3    Basophils Absolute 0.0 0.0 - 0.1 thou/mm3    Immature Grans (Abs) 0.66 (H) 0.00 - 0.07 thou/mm3    nRBC 0 /100 wbc   Comprehensive metabolic panel    Collection Time: 11/12/21 10:09 AM   Result Value Ref Range    Sodium 136 135 - 145 meq/L    Potassium 4.0 3.5 - 5.2 meq/L    Chloride 101 98 - 111 meq/L    CO2 21 (L) 23 - 33 meq/L    Glucose 135 (H) 70 - 108 mg/dL    BUN 18 7 - 22 mg/dL    CREATININE 0.9 0.4 - 1.2 mg/dL    Calcium 8.7 8.5 - 10.5 mg/dL    AST 33 5 - 40 U/L    Alkaline Phosphatase 84 38 - 126 U/L    Total Protein 5.9 (L) 6.1 - 8.0 g/dL    Albumin 3.3 (L) 3.5 - 5.1 g/dL    Total Bilirubin 0.5 0.3 - 1.2 mg/dL    ALT 71 (H) 11 - 66 U/L   Anion Gap    Collection Time: 11/12/21 10:09 AM   Result Value Ref Range    Anion Gap 14.0 8.0 - 16.0 meq/L   Glomerular Filtration Rate, Estimated    Collection Time: 11/12/21 10:09 AM   Result Value Ref Range    Est, Glom Filt Rate 82 (A) ml/min/1.73m2   Scan of Blood Smear    Collection Time: 11/12/21 10:09 AM   Result Value Ref Range    SCAN OF BLOOD SMEAR see below    POCT Glucose    Collection Time: 11/12/21 11:27 AM   Result Value Ref Range    POC Glucose 143 (H) 70 - 108 mg/dl   POCT Glucose    Collection Time: 11/12/21  3:54 PM   Result Value Ref Range    POC Glucose 103 70 - 108 mg/dl     MRI of brain with & without contrast (11/11/2021) : Impression   1. Mild atrophy and dilatation of the third and lateral ventricles. 2. Probable ischemic changes in the white matter. No evidence of an acute infarct.    3.. Mild inflammatory changes in the ethmoid air cells and mastoid air cells bilaterally. Impression:  · Debility / physical decondition with delirium, cognitive impairment likely due to E. coli and Enterococcus gallinarum bacteremia with encephalopathy  · Brain MRI evidence of cerebral atrophy  · Right nasal bone commuted fracture secondary to fall  · Recent history of bilateral lower lobe pneumonia with acute respiratory failure  · Diabetes mellitus type 2  · Hypertension  · Hyperlipidemia  · GERD  · Coronary artery disease  · History of bipolar disorder  · Impaired hearing acuity    The patient remains confused and not oriented to time or other person. He is able to follow one-step verbal command most of time. He had PT, OT and speech therapy sessions today. He expressed that he was tired at the end of therapy sessions and want to go back to bed to sleep afterward. The patient certainly will benefit from a daily rehab program to improve his function and possibly also cognition function. At the present time I do not think the patient can tolerate the minimal 3-hour rehab requirement for acute inpatient rehabilitation treatment. Less intense SNF subacute rehab program will be more tolerable for the patient with his current level of endurance. Plan:  · Continue current PT, OT, and speech therapy why the patient remained in acute hospital  · Patient is unlikely able to tolerate minimal 3-hour rehab intervention required for intensive acute inpatient rehab program.  Less intense subacute rehab program would be more appropriate for him at the present time.   · Recommend referral to SNF subacute rehab program as discharge rehab plan      Ivan Rucker MD

## 2021-11-12 NOTE — DISCHARGE INSTR - COC
Etiology Pressure Stage  1 11/12/21 0948   Dressing/Treatment Protective barrier 11/12/21 0948   Wound Assessment Pink/red; Non-blanchable erythema 11/09/21 1205   Drainage Amount None 11/09/21 1205   Odor None 11/09/21 1205   Kiara-wound Assessment Non-blanchable erythema 11/12/21 0948   Number of days: 5        Elimination:  Continence: Bowel: Yes  Bladder: No  Urinary Catheter: None   Colostomy/Ileostomy/Ileal Conduit: No       Date of Last BM: 11/12/2021    Intake/Output Summary (Last 24 hours) at 11/12/2021 1404  Last data filed at 11/12/2021 1133  Gross per 24 hour   Intake 360 ml   Output 0 ml   Net 360 ml     I/O last 3 completed shifts: In: 500 [P.O.:500]  Out: -     Safety Concerns:     History of Falls (last 30 days) and At Risk for Falls    Impairments/Disabilities:      None    Nutrition Therapy:  Current Nutrition Therapy:   - Oral Diet:  Dysphagia 3 advanced -SOFT AND BITE SIZED; CARB CONTROL 4 COUNT    Routes of Feeding: Oral  Liquids: Thin Liquids  Daily Fluid Restriction: no  Last Modified Barium Swallow with Video (Video Swallowing Test): not done    Treatments at the Time of Hospital Discharge:   Respiratory Treatments: SEE MAR  Oxygen Therapy:  is not on home oxygen therapy.   Ventilator:    - No ventilator support    Rehab Therapies: Physical Therapy and Occupational Therapy  Weight Bearing Status/Restrictions: No weight bearing restirctions  Other Medical Equipment (for information only, NOT a DME order):  walker  Other Treatments:     Patient's personal belongings (please select all that are sent with patient):      RN SIGNATURE:  Electronically signed by Trish Trejo RN on 11/13/21 at 8:52 AM EST    CASE MANAGEMENT/SOCIAL WORK SECTION    Inpatient Status Date: 11/12/2021    Readmission Risk Assessment Score:  Readmission Risk              Risk of Unplanned Readmission:  13           Discharging to Facility/ Agency   Name: [facility] [address]  HBEAD:[phone]  Fax:[phone]    Dialysis Facility (if applicable)   Name:  Address:  Dialysis Schedule:  Phone:  Fax:    / signature: Electronically signed by Sherron Singleton on 11/12/21 at 2:05 PM EST    PHYSICIAN SECTION    Prognosis: Good    Condition at Discharge: Stable    Rehab Potential (if transferring to Rehab): Good    Recommended Labs or Other Treatments After Discharge:     Physician Certification: I certify the above information and transfer of Neida Peters  is necessary for the continuing treatment of the diagnosis listed and that he requires [facility] for greater 30 days.      Update Admission H&P: No change in H&P    PHYSICIAN SIGNATURE:  Electronically signed by Rosalinda Caceres MD on 11/13/21 at 8:55 AM EST

## 2021-11-13 VITALS
DIASTOLIC BLOOD PRESSURE: 67 MMHG | TEMPERATURE: 98.7 F | OXYGEN SATURATION: 95 % | WEIGHT: 191 LBS | HEART RATE: 92 BPM | SYSTOLIC BLOOD PRESSURE: 120 MMHG | BODY MASS INDEX: 26.74 KG/M2 | HEIGHT: 71 IN | RESPIRATION RATE: 16 BRPM

## 2021-11-13 LAB
ALBUMIN SERPL-MCNC: 3 G/DL (ref 3.5–5.1)
ALP BLD-CCNC: 78 U/L (ref 38–126)
ALT SERPL-CCNC: 56 U/L (ref 11–66)
ANION GAP SERPL CALCULATED.3IONS-SCNC: 13 MEQ/L (ref 8–16)
AST SERPL-CCNC: 26 U/L (ref 5–40)
ATYPICAL LYMPHOCYTES: ABNORMAL %
BASOPHILS # BLD: 1 %
BASOPHILS ABSOLUTE: 0.1 THOU/MM3 (ref 0–0.1)
BILIRUB SERPL-MCNC: 0.4 MG/DL (ref 0.3–1.2)
BUN BLDV-MCNC: 19 MG/DL (ref 7–22)
CALCIUM SERPL-MCNC: 8.5 MG/DL (ref 8.5–10.5)
CHLORIDE BLD-SCNC: 103 MEQ/L (ref 98–111)
CO2: 22 MEQ/L (ref 23–33)
CREAT SERPL-MCNC: 0.7 MG/DL (ref 0.4–1.2)
EOSINOPHIL # BLD: 1.1 %
EOSINOPHILS ABSOLUTE: 0.1 THOU/MM3 (ref 0–0.4)
ERYTHROCYTE [DISTWIDTH] IN BLOOD BY AUTOMATED COUNT: 15.3 % (ref 11.5–14.5)
ERYTHROCYTE [DISTWIDTH] IN BLOOD BY AUTOMATED COUNT: 44.8 FL (ref 35–45)
GFR SERPL CREATININE-BSD FRML MDRD: > 90 ML/MIN/1.73M2
GLUCOSE BLD-MCNC: 107 MG/DL (ref 70–108)
GLUCOSE BLD-MCNC: 117 MG/DL (ref 70–108)
HCT VFR BLD CALC: 31.9 % (ref 42–52)
HEMOGLOBIN: 9.9 GM/DL (ref 14–18)
IMMATURE GRANS (ABS): 0.73 THOU/MM3 (ref 0–0.07)
IMMATURE GRANULOCYTES: 8 %
LYMPHOCYTES # BLD: 19.9 %
LYMPHOCYTES ABSOLUTE: 1.8 THOU/MM3 (ref 1–4.8)
MCH RBC QN AUTO: 25.1 PG (ref 26–33)
MCHC RBC AUTO-ENTMCNC: 31 GM/DL (ref 32.2–35.5)
MCV RBC AUTO: 81 FL (ref 80–94)
MONOCYTES # BLD: 10 %
MONOCYTES ABSOLUTE: 0.9 THOU/MM3 (ref 0.4–1.3)
NUCLEATED RED BLOOD CELLS: 0 /100 WBC
PLATELET # BLD: 231 THOU/MM3 (ref 130–400)
PLATELET ESTIMATE: ADEQUATE
PMV BLD AUTO: 9.6 FL (ref 9.4–12.4)
POTASSIUM SERPL-SCNC: 4.1 MEQ/L (ref 3.5–5.2)
RBC # BLD: 3.94 MILL/MM3 (ref 4.7–6.1)
SCAN OF BLOOD SMEAR: NORMAL
SEG NEUTROPHILS: 60 %
SEGMENTED NEUTROPHILS ABSOLUTE COUNT: 5.5 THOU/MM3 (ref 1.8–7.7)
SODIUM BLD-SCNC: 138 MEQ/L (ref 135–145)
TOTAL PROTEIN: 5.5 G/DL (ref 6.1–8)
WBC # BLD: 9.1 THOU/MM3 (ref 4.8–10.8)

## 2021-11-13 PROCEDURE — 85025 COMPLETE CBC W/AUTO DIFF WBC: CPT

## 2021-11-13 PROCEDURE — 36415 COLL VENOUS BLD VENIPUNCTURE: CPT

## 2021-11-13 PROCEDURE — 80053 COMPREHEN METABOLIC PANEL: CPT

## 2021-11-13 PROCEDURE — 99239 HOSP IP/OBS DSCHRG MGMT >30: CPT | Performed by: FAMILY MEDICINE

## 2021-11-13 PROCEDURE — 6370000000 HC RX 637 (ALT 250 FOR IP): Performed by: STUDENT IN AN ORGANIZED HEALTH CARE EDUCATION/TRAINING PROGRAM

## 2021-11-13 PROCEDURE — 82948 REAGENT STRIP/BLOOD GLUCOSE: CPT

## 2021-11-13 RX ORDER — ASCORBIC ACID 500 MG
500 TABLET ORAL DAILY
Qty: 30 TABLET | Refills: 3 | DISCHARGE
Start: 2021-11-14

## 2021-11-13 RX ORDER — FERROUS SULFATE 325(65) MG
325 TABLET ORAL EVERY OTHER DAY
Qty: 30 TABLET | Refills: 3 | DISCHARGE
Start: 2021-11-15

## 2021-11-13 RX ORDER — AMLODIPINE BESYLATE 5 MG/1
5 TABLET ORAL DAILY
Qty: 30 TABLET | Refills: 3 | DISCHARGE
Start: 2021-11-14

## 2021-11-13 RX ADMIN — ATORVASTATIN CALCIUM 10 MG: 10 TABLET, FILM COATED ORAL at 08:33

## 2021-11-13 RX ADMIN — AMLODIPINE BESYLATE 5 MG: 5 TABLET ORAL at 08:33

## 2021-11-13 RX ADMIN — CETIRIZINE HYDROCHLORIDE 10 MG: 10 TABLET, FILM COATED ORAL at 08:33

## 2021-11-13 RX ADMIN — OXYCODONE HYDROCHLORIDE AND ACETAMINOPHEN 500 MG: 500 TABLET ORAL at 08:33

## 2021-11-13 RX ADMIN — PANTOPRAZOLE SODIUM 40 MG: 40 TABLET, DELAYED RELEASE ORAL at 05:53

## 2021-11-13 RX ADMIN — CEFDINIR 300 MG: 300 CAPSULE ORAL at 08:33

## 2021-11-13 RX ADMIN — FERROUS SULFATE TAB 325 MG (65 MG ELEMENTAL FE) 325 MG: 325 (65 FE) TAB at 08:33

## 2021-11-13 RX ADMIN — DIVALPROEX SODIUM 500 MG: 500 TABLET, DELAYED RELEASE ORAL at 08:33

## 2021-11-13 ASSESSMENT — PAIN SCALES - GENERAL: PAINLEVEL_OUTOF10: 0

## 2021-11-13 NOTE — PROGRESS NOTES
Patient discharging to [facility]. Belongings gathered. Report called to [facility], nurse at the [facility]. Wife, Gladys Melendrez, notified. Pt and wife have no further questions at this time.

## 2021-11-13 NOTE — DISCHARGE SUMMARY
DISCHARGE SUMMARY      Patient Identification:   Quintin Justin   : [id number]  MRN: [id number]   Account: [de-identified]      Patient's PCP: Erick Daley MD    Admit Date: 2021     Discharge Date:   2021    Admitting Physician: Fariha Morales MD     Discharge Physician: Esau Hidalgo MD     Discharge Diagnoses:    Mechanical fall with comminuted fracture of right nasal bone due to generalized weakness/physical deconditioning - improving  No interventions for the fracture at this time  Discharging to SNF     Altered mental status due to acute metabolic encephalopathy, seizure ruled out - stable  Low clinic suspicion for stroke as he does not have any motor or sensory deficits or asymmetry on neuro exam.   EEG 2021 abnormal cortical dysfunction and theta slowing consistent with encephalopathy. No epileptiform activity. Neurology consulted, recommendations:              OP neurology follow up recommended              Depakote therapeutic at 83.5     Recent sepsis d/t pneumonia, with gram negative bacteremia - improving  Completed 6 day course of azithromycin   Continue cefdinir for additional 8 days to complete total 14 day antibiotic course.     Anion gap metabolic acidosis - resolved  Anion gap closed     Mild hyponatremia - resolved     Mild hypokalemia - resolved     Transaminitis  No complaints of abdominal pain. No abd pain on palpation; AST ALT slowly improving compared to 2 days ago.     HTN - improved  Discharge with Amlodipine     NIDDM, controlled  Continue home metformin     Normocytic anemia, JENIFFER - stable  B-12 and folate normal  Iron deficiency anemia workup   Continue oral iron + vitamin C     Bipolar disorder  CAD, HLD, GERD, OA, Chronic bilateral hearing loss  Continue home meds    The patient was seen and examined on day of discharge and this discharge summary is in conjunction with any daily progress note from day of discharge.     Hospital Course:   Quintin Justin is a 67 y.o. male admitted to [address] [place] [address] on 11/9/2021 for mechanical fall. Per admission H&P: \"Devante Madrid is a 67 y. o. male with PMHx of CAD, hypertension, hyperlipidemia, GERD, bipolar disorder, recently admitted here from [facility] after being found confused at home. Patient was found to have acute respiratory failure secondary to bilateral pneumonia.  Patient was found to be negative for Covid prior to transfer from [address] [place] [address] the course of his hospitalization, patient received antibiotic management and demonstrated improvement.  Patient was documented to be A+O x3 on 11/9/2021, and family members noted that patient's mental status was at baseline. Mary Dong was therefore discharged home on 11/9/2021.     However, it appears that patient sustained a fall upon return home. Mary Dong reportedly hit his head although there was no loss of consciousness.  Patient therefore was brought back to the ED.  According to the ED notes, patient's wife realized that patient is too weak to ambulate and that she is unable to safely care for him at home. In the ED, CT of the head without contrast demonstrated no acute intracranial pathology.  It did note comminuted fracture of the right nasal bone.  CT C-spine did not demonstrate any acute findings.  Urinary analysis negative for any acute infection.  Chest x-ray is negative for acute findings. \"     11/10: Mild hyponatremia is present transitioned off maxzide, replaced with hydralazine and amlodipine.     11/11: Neurology consulted d/t concern for seizures. EEG: Cortical dysfunction see with encephalopathy. No definitive evidence of epileptiform activity appreciated     11/12/2021  Awaiting placement  Blood cultures from [facility] reviewed, patient appropriately covered on Cefdinir. Repeat cultures on admission continue to be negative.     he is in overall stable and improved condition as compared to on admission, has responded well to management while admitted. Please see above for details regarding specific diagnoses. he is advised to follow up with neurology as OP; follow up with PCP in 1-2 weeks as well. Counseled to continue to monitor his symptoms and present to ED if any acute worsening of symptoms or development of new severe symptoms. he is being discharged to SNF in stable condition at this time. Plan of care discussed with patient and RN. Exam:     Vitals:  Vitals:    11/12/21 2115 11/12/21 2300 11/13/21 0400 11/13/21 0830   BP: (!) 141/71 137/67 (!) 144/71 120/67   Pulse: 88 76 84 92   Resp: 18 16 16 16   Temp: 98.1 °F (36.7 °C) 98.2 °F (36.8 °C) 98 °F (36.7 °C) 98.7 °F (37.1 °C)   TempSrc: Oral Oral Oral Oral   SpO2: 94% 95% 97% 95%   Weight:       Height:         Weight: Weight: 191 lb (86.6 kg)     24 hour intake/output:    Intake/Output Summary (Last 24 hours) at 11/13/2021 0918  Last data filed at 11/13/2021 0830  Gross per 24 hour   Intake 300 ml   Output 0 ml   Net 300 ml       Physical Exam      Labs: For convenience and continuity at follow-up the following most recent labs are provided:      CBC:    Lab Results   Component Value Date    WBC 9.1 11/13/2021    HGB 9.9 11/13/2021    HCT 31.9 11/13/2021     11/13/2021       Renal:    Lab Results   Component Value Date     11/13/2021    K 4.1 11/13/2021    K 3.3 11/11/2021     11/13/2021    CO2 22 11/13/2021    BUN 19 11/13/2021    CREATININE 0.7 11/13/2021    CALCIUM 8.5 11/13/2021         Significant Diagnostic Studies    Radiology:   MRI BRAIN W WO CONTRAST   Final Result       1. Mild atrophy and dilatation of the third and lateral ventricles. 2. Probable ischemic changes in the white matter. No evidence of an acute infarct. 3.. Mild inflammatory changes in the ethmoid air cells and mastoid air cells bilaterally. **This report has been created using voice recognition software.  It may contain minor errors which are inherent in voice recognition technology. **         Final report electronically signed by DR Chris Braun on 11/11/2021 4:29 PM      XR CHEST PORTABLE   Final Result   Stable radiographic appearance of the chest. No evidence of an acute process. **This report has been created using voice recognition software. It may contain minor errors which are inherent in voice recognition technology. **      Final report electronically signed by Dr. Nasir Mcconnell on 11/10/2021 3:25 PM      CT Head WO Contrast   Final Result   1. No acute intracranial pathology   2. Comminuted fracture right nasal bone. **This report has been created using voice recognition software. It may contain minor errors which are inherent in voice recognition technology. **      Final report electronically signed by Dr. Mercy Vargas on 11/9/2021 8:14 PM      CT Cervical Spine WO Contrast   Final Result   No acute process            **This report has been created using voice recognition software. It may contain minor errors which are inherent in voice recognition technology. **      Final report electronically signed by Dr. Mercy Vargas on 11/9/2021 8:45 PM      XR PELVIS (1-2 VIEWS)   Final Result   Negative exam            **This report has been created using voice recognition software. It may contain minor errors which are inherent in voice recognition technology. **      Final report electronically signed by Dr. Mercy Vargas on 11/9/2021 8:11 PM             Consults:     IP CONSULT TO SOCIAL WORK  IP CONSULT TO CASE MANAGEMENT  IP CONSULT TO NEUROLOGY  IP CONSULT TO PHYSICAL MEDICINE REHAB  IP CONSULT TO REHAB/TCU ADMISSION COORDINATOR    Disposition:    [] Home       [] TCU       [] Rehab       [] Psych       [x] SNF       [] [facility]       [] Other-    Condition at Discharge: Stable    Code Status:  Full Code     Patient Instructions: Activity: activity as tolerated  Diet: ADULT DIET;  Dysphagia - Soft and Bite Sized; 4 carb choices (60 gm/meal)      Follow-up visits:   Arabella Holt MD  [facility]  [address]  [address]  [phone]    Schedule an appointment as soon as possible for a visit           Discharge Medications:     @DISCHMEDS(<NOROUTINE> error)@    Time Spent on discharge is more than 30 minutes in the examination, evaluation, counseling and review of medications and discharge plan. Signed: Thank you Arabella Holt MD for the opportunity to be involved in this patient's care.     Electronically signed by Janelle Miller MD on 11/13/2021 at 9:18 AM

## 2021-11-16 LAB — VITAMIN B1 WHOLE BLOOD: 88 NMOL/L (ref 70–180)

## 2021-11-17 LAB — BLOOD CULTURE, ROUTINE: NORMAL

## 2021-11-26 NOTE — PROGRESS NOTES
CLINICAL PHARMACY NOTE: MEDS TO BEDS    Total # of Prescriptions Filled: 2   The following medications were delivered to the patient:  Cefdinir 300 mg  Azithromycin 250 mg    Additional Documentation:

## 2021-12-14 DIAGNOSIS — E78.00 PURE HYPERCHOLESTEROLEMIA: ICD-10-CM

## 2021-12-14 RX ORDER — LOVASTATIN 20 MG/1
20 TABLET ORAL DAILY
Qty: 90 TABLET | Refills: 0 | Status: SHIPPED | OUTPATIENT
Start: 2021-12-14

## 2022-01-19 DIAGNOSIS — E11.9 TYPE 2 DIABETES MELLITUS WITHOUT COMPLICATION, WITHOUT LONG-TERM CURRENT USE OF INSULIN (HCC): ICD-10-CM

## 2022-01-19 DIAGNOSIS — K21.9 GASTROESOPHAGEAL REFLUX DISEASE WITHOUT ESOPHAGITIS: ICD-10-CM

## 2022-01-19 DIAGNOSIS — I10 ESSENTIAL HYPERTENSION: ICD-10-CM

## 2022-01-20 RX ORDER — OMEPRAZOLE 20 MG/1
20 CAPSULE, DELAYED RELEASE ORAL DAILY
Qty: 90 CAPSULE | Refills: 0 | Status: SHIPPED | OUTPATIENT
Start: 2022-01-20

## 2022-01-20 RX ORDER — TRIAMTERENE AND HYDROCHLOROTHIAZIDE 37.5; 25 MG/1; MG/1
1 TABLET ORAL DAILY
Qty: 90 TABLET | Refills: 0 | Status: SHIPPED | OUTPATIENT
Start: 2022-01-20

## 2022-01-20 RX ORDER — DIVALPROEX SODIUM 250 MG/1
500 TABLET, DELAYED RELEASE ORAL 2 TIMES DAILY
Qty: 360 TABLET | Refills: 0 | Status: SHIPPED | OUTPATIENT
Start: 2022-01-20

## 2022-03-24 DIAGNOSIS — E78.00 PURE HYPERCHOLESTEROLEMIA: ICD-10-CM

## 2022-03-24 RX ORDER — LOVASTATIN 20 MG/1
20 TABLET ORAL DAILY
Qty: 90 TABLET | Refills: 0 | OUTPATIENT
Start: 2022-03-24

## 2023-03-15 ENCOUNTER — HOSPITAL ENCOUNTER (OUTPATIENT)
Age: 78
Discharge: HOME | End: 2023-03-15
Payer: MEDICARE

## 2023-03-15 LAB
DEPRECATED RDW RBC AUTO: 46.5 FL (ref 35–45)
ERYTHROCYTE [DISTWIDTH] IN BLOOD BY AUTOMATED COUNT: 17.2 % (ref 11.5–14.5)
HCT VFR BLD AUTO: 32.2 % (ref 42–52)
HGB BLD-MCNC: 9.3 GM/DL (ref 14–18)
MCH RBC QN AUTO: 21.6 PG (ref 26–33)
MCHC RBC AUTO-ENTMCNC: 28.9 GM/DL (ref 32.2–35.5)
MCV RBC AUTO: 74.7 FL (ref 80–94)
PLATELET # BLD AUTO: 473 THOU/MM3 (ref 130–400)
PMV BLD AUTO: 9.7 FL (ref 9.4–12.4)
RBC # BLD AUTO: 4.31 MILL/MM3 (ref 4.7–6.1)
WBC # BLD AUTO: 7.2 THOU/MM3 (ref 4.8–10.8)

## 2023-03-15 PROCEDURE — 82728 ASSAY OF FERRITIN: CPT

## 2023-03-15 PROCEDURE — 85027 COMPLETE CBC AUTOMATED: CPT

## 2023-03-15 PROCEDURE — 36415 COLL VENOUS BLD VENIPUNCTURE: CPT

## 2023-03-15 PROCEDURE — 83540 ASSAY OF IRON: CPT

## 2023-03-15 PROCEDURE — 83550 IRON BINDING TEST: CPT

## 2023-03-16 LAB
FERRITIN SERPL IA-MCNC: 9 NG/ML (ref 22–322)
IRON SERPL-MCNC: 21 UG/DL (ref 65–195)
TIBC SERPL-MCNC: 439 UG/DL (ref 171–450)

## 2023-05-11 ENCOUNTER — HOSPITAL ENCOUNTER (OUTPATIENT)
Age: 78
Discharge: HOME | End: 2023-05-11
Payer: MEDICARE

## 2023-05-11 PROCEDURE — 36415 COLL VENOUS BLD VENIPUNCTURE: CPT

## 2023-05-11 PROCEDURE — 82728 ASSAY OF FERRITIN: CPT

## 2023-05-11 PROCEDURE — 83550 IRON BINDING TEST: CPT

## 2023-05-11 PROCEDURE — 83540 ASSAY OF IRON: CPT

## 2023-05-12 LAB
FERRITIN SERPL IA-MCNC: 26 NG/ML (ref 22–322)
IRON SERPL-MCNC: 73 UG/DL (ref 65–195)
TIBC SERPL-MCNC: 369 UG/DL (ref 171–450)

## 2024-05-09 ENCOUNTER — HOSPITAL ENCOUNTER (OUTPATIENT)
Dept: CT IMAGING | Age: 79
Discharge: HOME | End: 2024-05-09
Attending: RADIOLOGY

## 2024-05-09 DIAGNOSIS — R69 DIAGNOSIS UNKNOWN: ICD-10-CM
